# Patient Record
Sex: FEMALE | Employment: FULL TIME | URBAN - METROPOLITAN AREA
[De-identification: names, ages, dates, MRNs, and addresses within clinical notes are randomized per-mention and may not be internally consistent; named-entity substitution may affect disease eponyms.]

---

## 2021-10-29 DIAGNOSIS — M25.561 RIGHT KNEE PAIN, UNSPECIFIED CHRONICITY: Primary | ICD-10-CM

## 2021-11-11 ENCOUNTER — HOSPITAL ENCOUNTER (OUTPATIENT)
Dept: RADIOLOGY | Facility: HOSPITAL | Age: 56
Discharge: HOME | End: 2021-11-11
Attending: ORTHOPAEDIC SURGERY
Payer: COMMERCIAL

## 2021-11-11 ENCOUNTER — OFFICE VISIT (OUTPATIENT)
Dept: ORTHOPEDICS | Facility: CLINIC | Age: 56
End: 2021-11-11
Payer: COMMERCIAL

## 2021-11-11 VITALS
DIASTOLIC BLOOD PRESSURE: 89 MMHG | HEIGHT: 63 IN | HEART RATE: 75 BPM | WEIGHT: 260 LBS | SYSTOLIC BLOOD PRESSURE: 143 MMHG | BODY MASS INDEX: 46.07 KG/M2

## 2021-11-11 DIAGNOSIS — S83.241A ACUTE MEDIAL MENISCUS TEAR OF RIGHT KNEE, INITIAL ENCOUNTER: ICD-10-CM

## 2021-11-11 DIAGNOSIS — M17.11 ARTHRITIS OF KNEE, RIGHT: Primary | ICD-10-CM

## 2021-11-11 DIAGNOSIS — M25.561 RIGHT KNEE PAIN, UNSPECIFIED CHRONICITY: ICD-10-CM

## 2021-11-11 PROCEDURE — 1159F MED LIST DOCD IN RCRD: CPT | Mod: CPTII,S$GLB,, | Performed by: ORTHOPAEDIC SURGERY

## 2021-11-11 PROCEDURE — 3079F DIAST BP 80-89 MM HG: CPT | Mod: CPTII,S$GLB,, | Performed by: ORTHOPAEDIC SURGERY

## 2021-11-11 PROCEDURE — 73562 X-RAY EXAM OF KNEE 3: CPT | Mod: 26,LT,, | Performed by: RADIOLOGY

## 2021-11-11 PROCEDURE — 1160F PR REVIEW ALL MEDS BY PRESCRIBER/CLIN PHARMACIST DOCUMENTED: ICD-10-PCS | Mod: CPTII,S$GLB,, | Performed by: ORTHOPAEDIC SURGERY

## 2021-11-11 PROCEDURE — 73564 X-RAY EXAM KNEE 4 OR MORE: CPT | Mod: TC,RT

## 2021-11-11 PROCEDURE — 3008F PR BODY MASS INDEX (BMI) DOCUMENTED: ICD-10-PCS | Mod: CPTII,S$GLB,, | Performed by: ORTHOPAEDIC SURGERY

## 2021-11-11 PROCEDURE — 20610 DRAIN/INJ JOINT/BURSA W/O US: CPT | Mod: RT,S$GLB,, | Performed by: ORTHOPAEDIC SURGERY

## 2021-11-11 PROCEDURE — 3077F PR MOST RECENT SYSTOLIC BLOOD PRESSURE >= 140 MM HG: ICD-10-PCS | Mod: CPTII,S$GLB,, | Performed by: ORTHOPAEDIC SURGERY

## 2021-11-11 PROCEDURE — 99204 OFFICE O/P NEW MOD 45 MIN: CPT | Mod: 25,S$GLB,, | Performed by: ORTHOPAEDIC SURGERY

## 2021-11-11 PROCEDURE — 3079F PR MOST RECENT DIASTOLIC BLOOD PRESSURE 80-89 MM HG: ICD-10-PCS | Mod: CPTII,S$GLB,, | Performed by: ORTHOPAEDIC SURGERY

## 2021-11-11 PROCEDURE — 73562 XR KNEE ORTHO RIGHT WITH FLEXION: ICD-10-PCS | Mod: 26,LT,, | Performed by: RADIOLOGY

## 2021-11-11 PROCEDURE — 20610 LARGE JOINT ASPIRATION/INJECTION: R KNEE: ICD-10-PCS | Mod: RT,S$GLB,, | Performed by: ORTHOPAEDIC SURGERY

## 2021-11-11 PROCEDURE — 99999 PR PBB SHADOW E&M-EST. PATIENT-LVL III: ICD-10-PCS | Mod: PBBFAC,,, | Performed by: ORTHOPAEDIC SURGERY

## 2021-11-11 PROCEDURE — 3077F SYST BP >= 140 MM HG: CPT | Mod: CPTII,S$GLB,, | Performed by: ORTHOPAEDIC SURGERY

## 2021-11-11 PROCEDURE — 73564 X-RAY EXAM KNEE 4 OR MORE: CPT | Mod: 26,RT,, | Performed by: RADIOLOGY

## 2021-11-11 PROCEDURE — 99204 PR OFFICE/OUTPT VISIT, NEW, LEVL IV, 45-59 MIN: ICD-10-PCS | Mod: 25,S$GLB,, | Performed by: ORTHOPAEDIC SURGERY

## 2021-11-11 PROCEDURE — 73564 XR KNEE ORTHO RIGHT WITH FLEXION: ICD-10-PCS | Mod: 26,RT,, | Performed by: RADIOLOGY

## 2021-11-11 PROCEDURE — 1160F RVW MEDS BY RX/DR IN RCRD: CPT | Mod: CPTII,S$GLB,, | Performed by: ORTHOPAEDIC SURGERY

## 2021-11-11 PROCEDURE — 3008F BODY MASS INDEX DOCD: CPT | Mod: CPTII,S$GLB,, | Performed by: ORTHOPAEDIC SURGERY

## 2021-11-11 PROCEDURE — 99999 PR PBB SHADOW E&M-EST. PATIENT-LVL III: CPT | Mod: PBBFAC,,, | Performed by: ORTHOPAEDIC SURGERY

## 2021-11-11 PROCEDURE — 1159F PR MEDICATION LIST DOCUMENTED IN MEDICAL RECORD: ICD-10-PCS | Mod: CPTII,S$GLB,, | Performed by: ORTHOPAEDIC SURGERY

## 2021-11-11 RX ORDER — CHOLECALCIFEROL (VITAMIN D3) 25 MCG
185 TABLET ORAL
COMMUNITY

## 2021-11-11 RX ORDER — METHYLPREDNISOLONE 4 MG/1
TABLET ORAL
COMMUNITY
Start: 2021-07-14 | End: 2022-12-05

## 2021-11-11 RX ORDER — DOXYCYCLINE 100 MG/1
100 CAPSULE ORAL EVERY 12 HOURS
COMMUNITY
Start: 2021-07-14 | End: 2022-12-05

## 2021-11-11 RX ORDER — METHYLPREDNISOLONE ACETATE 80 MG/ML
80 INJECTION, SUSPENSION INTRA-ARTICULAR; INTRALESIONAL; INTRAMUSCULAR; SOFT TISSUE
Status: DISCONTINUED | OUTPATIENT
Start: 2021-11-11 | End: 2021-11-11 | Stop reason: HOSPADM

## 2021-11-11 RX ORDER — DEXTROMETHORPHAN HYDROBROMIDE, GUAIFENESIN, PHENYLEPHRINE HYDROCHLORIDE 17.5; 400; 1 MG/1; MG/1; MG/1
1 TABLET ORAL 4 TIMES DAILY
COMMUNITY
Start: 2021-07-14 | End: 2022-12-05

## 2021-11-11 RX ORDER — ASPIRIN 81 MG/1
TABLET ORAL
COMMUNITY

## 2021-11-11 RX ORDER — PROMETHAZINE HYDROCHLORIDE AND DEXTROMETHORPHAN HYDROBROMIDE 6.25; 15 MG/5ML; MG/5ML
SYRUP ORAL
COMMUNITY
Start: 2021-07-14 | End: 2022-12-05

## 2021-11-11 RX ADMIN — METHYLPREDNISOLONE ACETATE 80 MG: 80 INJECTION, SUSPENSION INTRA-ARTICULAR; INTRALESIONAL; INTRAMUSCULAR; SOFT TISSUE at 02:11

## 2022-03-10 ENCOUNTER — OFFICE VISIT (OUTPATIENT)
Dept: ORTHOPEDICS | Facility: CLINIC | Age: 57
End: 2022-03-10
Payer: COMMERCIAL

## 2022-03-10 VITALS — BODY MASS INDEX: 48.46 KG/M2 | WEIGHT: 273.5 LBS | HEIGHT: 63 IN

## 2022-03-10 DIAGNOSIS — M17.11 ARTHRITIS OF KNEE, RIGHT: Primary | ICD-10-CM

## 2022-03-10 DIAGNOSIS — S83.241A ACUTE MEDIAL MENISCUS TEAR OF RIGHT KNEE, INITIAL ENCOUNTER: ICD-10-CM

## 2022-03-10 PROBLEM — E55.9 VITAMIN D DEFICIENCY: Status: ACTIVE | Noted: 2022-03-10

## 2022-03-10 PROBLEM — E78.1 HYPERTRIGLYCERIDEMIA: Status: ACTIVE | Noted: 2022-03-10

## 2022-03-10 PROCEDURE — 99213 OFFICE O/P EST LOW 20 MIN: CPT | Mod: 25,S$GLB,, | Performed by: ORTHOPAEDIC SURGERY

## 2022-03-10 PROCEDURE — 1159F PR MEDICATION LIST DOCUMENTED IN MEDICAL RECORD: ICD-10-PCS | Mod: CPTII,S$GLB,, | Performed by: ORTHOPAEDIC SURGERY

## 2022-03-10 PROCEDURE — 1159F MED LIST DOCD IN RCRD: CPT | Mod: CPTII,S$GLB,, | Performed by: ORTHOPAEDIC SURGERY

## 2022-03-10 PROCEDURE — 99213 PR OFFICE/OUTPT VISIT, EST, LEVL III, 20-29 MIN: ICD-10-PCS | Mod: 25,S$GLB,, | Performed by: ORTHOPAEDIC SURGERY

## 2022-03-10 PROCEDURE — 3008F BODY MASS INDEX DOCD: CPT | Mod: CPTII,S$GLB,, | Performed by: ORTHOPAEDIC SURGERY

## 2022-03-10 PROCEDURE — 99999 PR PBB SHADOW E&M-EST. PATIENT-LVL III: ICD-10-PCS | Mod: PBBFAC,,, | Performed by: ORTHOPAEDIC SURGERY

## 2022-03-10 PROCEDURE — 99999 PR PBB SHADOW E&M-EST. PATIENT-LVL III: CPT | Mod: PBBFAC,,, | Performed by: ORTHOPAEDIC SURGERY

## 2022-03-10 PROCEDURE — 20610 DRAIN/INJ JOINT/BURSA W/O US: CPT | Mod: RT,S$GLB,, | Performed by: ORTHOPAEDIC SURGERY

## 2022-03-10 PROCEDURE — 20610 LARGE JOINT ASPIRATION/INJECTION: R KNEE: ICD-10-PCS | Mod: RT,S$GLB,, | Performed by: ORTHOPAEDIC SURGERY

## 2022-03-10 PROCEDURE — 3008F PR BODY MASS INDEX (BMI) DOCUMENTED: ICD-10-PCS | Mod: CPTII,S$GLB,, | Performed by: ORTHOPAEDIC SURGERY

## 2022-03-10 RX ORDER — METHYLPREDNISOLONE ACETATE 80 MG/ML
80 INJECTION, SUSPENSION INTRA-ARTICULAR; INTRALESIONAL; INTRAMUSCULAR; SOFT TISSUE
Status: DISCONTINUED | OUTPATIENT
Start: 2022-03-10 | End: 2022-03-10 | Stop reason: HOSPADM

## 2022-03-10 RX ADMIN — METHYLPREDNISOLONE ACETATE 80 MG: 80 INJECTION, SUSPENSION INTRA-ARTICULAR; INTRALESIONAL; INTRAMUSCULAR; SOFT TISSUE at 02:03

## 2022-03-10 NOTE — PROGRESS NOTES
Subjective:     Patient ID: Simi Constantino is a 56 y.o. female.    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  She used to fall a lot down stairs growing up.  At 1 point Dr.Jared Raya at [facility]  injected her knee with good relief.  Now she is having something moving and catching inside her knee with some swelling.  Any time she tries to bend it she feels something moves in then it will go and get going.  She does go to [employer] and does quite a bit of quad strengthening and hamstring strengthening exercises and she described which she does.  Now she is working for the [employer] as a nurse.  She took up to 1600 mg of ibuprofen a day to help ease it up and now she is down to 600 mg. She feels some tightness in the knee.  No history of numbness or tingling or back pain or history of gout    No fever no chills no shortness of breath or difficulty with chewing or swallowing loss of bowel bladder control blurry vision double vision or loss of sense smell or taste    03/10/2022  Right knee arthritis.  Last visit we injected her with Depo-Medrol with great relief.  She takes ibuprofen 600 sometimes 800 mg once a day.  Occasional Tylenol.  She still working.  Her pain around 3/10.  With activities it gets really bad.  She is maintaining quite active life and she does exercise.  No fever no chills no shortness of breath or difficulty with chewing or swallowing loss of bowel bladder control blurry vision double vision loss sense smell or taste.  The injection seems to have helped quite a bit that she is not feeling catching locking and we did not proceed with the MRI  No past medical history on file.  No past surgical history on file.  Family History   Problem Relation Age of Onset    No Known Problems Mother     No Known Problems Father     No Known Problems Sister     No Known Problems Brother      No Known Problems Maternal Grandmother     No Known Problems Maternal Grandfather     No Known Problems Paternal Grandmother     No Known Problems Paternal Grandfather      Social History     Socioeconomic History    Marital status:    Tobacco Use    Smoking status: Never Smoker    Smokeless tobacco: Never Used     Medication List with Changes/Refills   Current Medications    ASPIRIN (ECOTRIN) 81 MG EC TABLET        DECONEX DMX 10-17.5-400 MG TAB    Take 1 tablet by mouth 4 (four) times daily.    DOXYCYCLINE (VIBRAMYCIN) 100 MG CAP    Take 100 mg by mouth every 12 (twelve) hours.    METHYLPREDNISOLONE (MEDROL DOSEPACK) 4 MG TABLET    Take by mouth.    PROMETHAZINE-DEXTROMETHORPHAN (PROMETHAZINE-DM) 6.25-15 MG/5 ML SYRP    SMARTSI Milliliter(s) By Mouth PRN    VITAMIN D (VITAMIN D3) 1000 UNITS TAB    Take 185 mg by mouth.     Review of patient's allergies indicates:  No Known Allergies  Review of Systems   Constitutional: Negative for decreased appetite.   HENT: Negative for tinnitus.    Eyes: Negative for double vision.   Cardiovascular: Negative for chest pain.   Respiratory: Negative for wheezing.    Hematologic/Lymphatic: Negative for bleeding problem.   Skin: Negative for dry skin.   Musculoskeletal: Positive for joint pain and stiffness. Negative for arthritis, back pain, gout, joint swelling and neck pain.   Gastrointestinal: Negative for abdominal pain.   Genitourinary: Negative for bladder incontinence.   Neurological: Negative for numbness, paresthesias and sensory change.   Psychiatric/Behavioral: Negative for altered mental status.       Objective:   Body mass index is 48.44 kg/m².  There were no vitals filed for this visit.       General    Constitutional: She is oriented to person, place, and time. She appears well-developed.   HENT:   Head: Atraumatic.   Eyes: EOM are normal.   Cardiovascular: Normal rate.    Pulmonary/Chest: Effort normal.   Neurological: She is alert and oriented to  person, place, and time.   Psychiatric: Judgment normal.           Ambulating with very slight limp.    Pelvis is level  Bilateral hips full motion no pain in the groin  Hip flexors, abductors, adductors, quads, hamstrings, ankle extensors and flexors are 5/5  Left knee with full motion no pain.  Collaterals and cruciates are stable.  No crepitus no warmness to touch no swelling  Right knee mild swelling.  Medial joint tenderness.  Positive Meghann sign medially.  There is some crepitus to compression on the patella.  There is a plica  you can feel on the lateral femoral condyle.  Collaterals and cruciates are stable.  Range of motion 0-120 degrees  Calves are soft nontender  Ankle motion is intact  Negative straight leg raising    Relevant imaging results reviewed and interpreted by me, discussed with the patient and / or family today     X-ray 11/11/2021 bilateral knees with the right knee moderate medial joint loss with marginal osteophytes most pronounced medially in and slightly anteriorly.  There is no evidence of fracture with mild effusion.  Left knee with very mild if any medial joint narrowing.  No fracture no osteophytes seen      Assessment:     Encounter Diagnoses   Name Primary?    Arthritis of knee, right Yes    Acute medial meniscus tear of right knee, initial encounter         Plan:   Arthritis of knee, right  -     Large Joint Aspiration/Injection: R knee    Acute medial meniscus tear of right knee, initial encounter         Patient Instructions   Ibuprofen 600 mg maximum does a daily is 4 times a day to make it 2400 mg  Tylenol 650 mg twice a day  Since the last steroid injection helped minimize the intake of NSAIDs to 600 mg a day we will repeat the injection today  Next time you would need viscosupplementation/rooster comb gel/Synvisc-One.  Synvisc-One if it helps you can have it repeated once every 6 months.  Synvisc-One might take 6-8 weeks for it to work  I will see you back in 3  months      The pros and cons of steroid injection discussed.  Also the pros and cons of NSAIDs/ibuprofen.  If she still having the catching feeling that she described we will obtain MRI on her right knee.  She is maintaining very active gym exercise program which is at this point sufficient and does not need to go to therapy.    Disclaimer: This note was prepared using a voice recognition system and is likely to have sound alike errors within the text.

## 2022-03-10 NOTE — PROCEDURES
Large Joint Aspiration/Injection: R knee    Date/Time: 3/10/2022 2:20 PM  Performed by: Arden Sullivan MD  Authorized by: Arden Sullivan MD     Consent Done?:  Yes (Verbal)  Site marked: the procedure site was marked    Timeout: prior to procedure the correct patient, procedure, and site was verified      Local anesthesia used?: Yes    Local anesthetic:  Lidocaine 1% without epinephrine    Details:  Needle Size:  22 G  Ultrasonic Guidance for needle placement?: No    Approach:  Anterolateral  Location:  Knee  Site:  R knee  Medications:  80 mg methylPREDNISolone acetate 80 mg/mL  Patient tolerance:  Patient tolerated the procedure well with no immediate complications

## 2022-03-10 NOTE — PATIENT INSTRUCTIONS
Ibuprofen 600 mg maximum does a daily is 4 times a day to make it 2400 mg  Tylenol 650 mg twice a day  Since the last steroid injection helped minimize the intake of NSAIDs to 600 mg a day we will repeat the injection today  Next time you would need viscosupplementation/rooster comb gel/Synvisc-One.  Synvisc-One if it helps you can have it repeated once every 6 months.  Synvisc-One might take 6-8 weeks for it to work  I will see you back in 3 months

## 2022-12-05 ENCOUNTER — OFFICE VISIT (OUTPATIENT)
Dept: ORTHOPEDICS | Facility: CLINIC | Age: 57
End: 2022-12-05
Payer: COMMERCIAL

## 2022-12-05 VITALS — WEIGHT: 273 LBS | HEIGHT: 63 IN | BODY MASS INDEX: 48.37 KG/M2

## 2022-12-05 DIAGNOSIS — M17.11 ARTHRITIS OF KNEE, RIGHT: Primary | ICD-10-CM

## 2022-12-05 PROCEDURE — 20610 DRAIN/INJ JOINT/BURSA W/O US: CPT | Mod: RT,S$GLB,, | Performed by: ORTHOPAEDIC SURGERY

## 2022-12-05 PROCEDURE — 1159F PR MEDICATION LIST DOCUMENTED IN MEDICAL RECORD: ICD-10-PCS | Mod: CPTII,S$GLB,, | Performed by: ORTHOPAEDIC SURGERY

## 2022-12-05 PROCEDURE — 99213 OFFICE O/P EST LOW 20 MIN: CPT | Mod: 25,S$GLB,, | Performed by: ORTHOPAEDIC SURGERY

## 2022-12-05 PROCEDURE — 3008F BODY MASS INDEX DOCD: CPT | Mod: CPTII,S$GLB,, | Performed by: ORTHOPAEDIC SURGERY

## 2022-12-05 PROCEDURE — 99999 PR PBB SHADOW E&M-EST. PATIENT-LVL III: CPT | Mod: PBBFAC,,, | Performed by: ORTHOPAEDIC SURGERY

## 2022-12-05 PROCEDURE — 3008F PR BODY MASS INDEX (BMI) DOCUMENTED: ICD-10-PCS | Mod: CPTII,S$GLB,, | Performed by: ORTHOPAEDIC SURGERY

## 2022-12-05 PROCEDURE — 99213 PR OFFICE/OUTPT VISIT, EST, LEVL III, 20-29 MIN: ICD-10-PCS | Mod: 25,S$GLB,, | Performed by: ORTHOPAEDIC SURGERY

## 2022-12-05 PROCEDURE — 1159F MED LIST DOCD IN RCRD: CPT | Mod: CPTII,S$GLB,, | Performed by: ORTHOPAEDIC SURGERY

## 2022-12-05 PROCEDURE — 20610 LARGE JOINT ASPIRATION/INJECTION: R KNEE: ICD-10-PCS | Mod: RT,S$GLB,, | Performed by: ORTHOPAEDIC SURGERY

## 2022-12-05 PROCEDURE — 99999 PR PBB SHADOW E&M-EST. PATIENT-LVL III: ICD-10-PCS | Mod: PBBFAC,,, | Performed by: ORTHOPAEDIC SURGERY

## 2022-12-05 RX ORDER — IBUPROFEN 800 MG/1
TABLET ORAL
COMMUNITY

## 2022-12-05 RX ORDER — METHYLPREDNISOLONE ACETATE 80 MG/ML
80 INJECTION, SUSPENSION INTRA-ARTICULAR; INTRALESIONAL; INTRAMUSCULAR; SOFT TISSUE
Status: DISCONTINUED | OUTPATIENT
Start: 2022-12-05 | End: 2022-12-05 | Stop reason: HOSPADM

## 2022-12-05 RX ADMIN — METHYLPREDNISOLONE ACETATE 80 MG: 80 INJECTION, SUSPENSION INTRA-ARTICULAR; INTRALESIONAL; INTRAMUSCULAR; SOFT TISSUE at 07:12

## 2022-12-05 NOTE — PATIENT INSTRUCTIONS
Right knee arthritis and the Depo-Medrol injection seems to work really well for long time   Will hold off on viscosupplementation at this time   Take Tylenol 650 mg not to exceed twice a day if needed before you trach ibuprofen 800   Ice the needed next few days if it hurts more  Will see you back in 6 months

## 2022-12-05 NOTE — PROGRESS NOTES
Subjective:     Patient ID: Simi Constantino is a 57 y.o. female.    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  She used to fall a lot down stairs growing up.  At 1 point Dr[facility]Jared Raya at [facility]  injected her knee with good relief.  Now she is having something moving and catching inside her knee with some swelling.  Any time she tries to bend it she feels something moves in then it will go and get going.  She does go to [employer] and does quite a bit of quad strengthening and hamstring strengthening exercises and she described which she does.  Now she is working for the [employer] as a nurse.  She took up to 1600 mg of ibuprofen a day to help ease it up and now she is down to 600 mg. She feels some tightness in the knee.  No history of numbness or tingling or back pain or history of gout    No fever no chills no shortness of breath or difficulty with chewing or swallowing loss of bowel bladder control blurry vision double vision or loss of sense smell or taste    03/10/2022  Right knee arthritis.  Last visit we injected her with Depo-Medrol with great relief.  She takes ibuprofen 600 sometimes 800 mg once a day.  Occasional Tylenol.  She still working.  Her pain around 3/10.  With activities it gets really bad.  She is maintaining quite active life and she does exercise.  No fever no chills no shortness of breath or difficulty with chewing or swallowing loss of bowel bladder control blurry vision double vision loss sense smell or taste.  The injection seems to have helped quite a bit that she is not feeling catching locking and we did not proceed with the MRI        12/05/2022   Right knee arthritis.  She stated the Depo-Medrol last time given hurt a lot that day but the next day the pain completely subsided.  She only takes ibuprofen 800 once a day once it wears off.  This time only wore off like  a month ago.  We did discuss taking Tylenol instead of ibuprofen to decrease the risk of gastrointestinal issues as well as kidney issues.  Her pain is 5/10 when she over does it.  She still working at the state.    No fever no chills no shortness of breath or difficulty with chewing swallowing loss of bowel bladder control.  There is no feeling of any catching locking at this time  History reviewed. No pertinent past medical history.  History reviewed. No pertinent surgical history.  Family History   Problem Relation Age of Onset    No Known Problems Mother     No Known Problems Father     No Known Problems Sister     No Known Problems Brother     No Known Problems Maternal Grandmother     No Known Problems Maternal Grandfather     No Known Problems Paternal Grandmother     No Known Problems Paternal Grandfather      Social History     Socioeconomic History    Marital status:    Tobacco Use    Smoking status: Never    Smokeless tobacco: Never     Medication List with Changes/Refills   Current Medications    ASPIRIN (ECOTRIN) 81 MG EC TABLET        IBUPROFEN (ADVIL,MOTRIN) 800 MG TABLET    800 MG  .    VITAMIN D (VITAMIN D3) 1000 UNITS TAB    Take 185 mg by mouth.   Discontinued Medications    DECONEX DMX 10-17.5-400 MG TAB    Take 1 tablet by mouth 4 (four) times daily.    DOXYCYCLINE (VIBRAMYCIN) 100 MG CAP    Take 100 mg by mouth every 12 (twelve) hours.    METHYLPREDNISOLONE (MEDROL DOSEPACK) 4 MG TABLET    Take by mouth.    PROMETHAZINE-DEXTROMETHORPHAN (PROMETHAZINE-DM) 6.25-15 MG/5 ML SYRP    SMARTSI Milliliter(s) By Mouth PRN     Review of patient's allergies indicates:  No Known Allergies  Review of Systems   Constitutional: Negative for decreased appetite.   HENT:  Negative for tinnitus.    Eyes:  Negative for double vision.   Cardiovascular:  Negative for chest pain.   Respiratory:  Negative for wheezing.    Hematologic/Lymphatic: Negative for bleeding problem.   Skin:  Negative for dry skin.    Musculoskeletal:  Positive for joint pain and stiffness. Negative for arthritis, back pain, gout, joint swelling and neck pain.   Gastrointestinal:  Negative for abdominal pain.   Genitourinary:  Negative for bladder incontinence.   Neurological:  Negative for numbness, paresthesias and sensory change.   Psychiatric/Behavioral:  Negative for altered mental status.      Objective:   Body mass index is 48.36 kg/m².  There were no vitals filed for this visit.       General    Constitutional: She is oriented to person, place, and time. She appears well-developed.   HENT:   Head: Atraumatic.   Eyes: EOM are normal.   Cardiovascular:  Normal rate.            Pulmonary/Chest: Effort normal.   Neurological: She is alert and oriented to person, place, and time.   Psychiatric: Judgment normal.         Ambulating with very slight limp.    Pelvis is level  Bilateral hips full motion no pain in the groin  Hip flexors, abductors, adductors, quads, hamstrings, ankle extensors and flexors are 5/5  Left knee with full motion no pain.  Collaterals and cruciates are stable.  No crepitus no warmness to touch no swelling  Right knee mild swelling.  Medial joint tenderness.  Positive Meghann sign medially.  There is some crepitus to compression on the patella.  There is a plica  you can feel on the lateral femoral condyle.  Collaterals and cruciates are stable.  Range of motion 0-120 degrees  Calves are soft nontender  Ankle motion is intact  Negative straight leg raising    Relevant imaging results reviewed and interpreted by me, discussed with the patient and / or family today     X-ray 11/11/2021 bilateral knees with the right knee moderate medial joint loss with marginal osteophytes most pronounced medially in and slightly anteriorly.  There is no evidence of fracture with mild effusion.  Left knee with very mild if any medial joint narrowing.  No fracture no osteophytes seen      Assessment:     Encounter Diagnosis   Name  Primary?    Arthritis of knee, right Yes        Plan:   Arthritis of knee, right  -     Large Joint Aspiration/Injection: R knee       Patient Instructions   Right knee arthritis and the Depo-Medrol injection seems to work really well for long time   Will hold off on viscosupplementation at this time   Take Tylenol 650 mg not to exceed twice a day if needed before you trach ibuprofen 800   Ice the needed next few days if it hurts more  Will see you back in 6 months    The pros and cons of steroid injection discussed.  Also the pros and cons of NSAIDs/ibuprofen.  If she still having the catching feeling that she described we will obtain MRI on her right knee.  She is maintaining very active gym exercise program which is at this point sufficient and does not need to go to therapy.    Disclaimer: This note was prepared using a voice recognition system and is likely to have sound alike errors within the text.

## 2022-12-05 NOTE — PROCEDURES
Large Joint Aspiration/Injection: R knee    Date/Time: 12/5/2022 7:40 AM  Performed by: Arden Sullivan MD  Authorized by: Arden Sullivan MD     Consent Done?:  Yes (Verbal)  Indications:  Arthritis  Site marked: the procedure site was marked    Timeout: prior to procedure the correct patient, procedure, and site was verified      Local anesthesia used?: Yes    Local anesthetic:  Lidocaine 1% without epinephrine    Details:  Needle Size:  22 G  Ultrasonic Guidance for needle placement?: No    Approach:  Anterolateral  Location:  Knee  Site:  R knee  Medications:  80 mg methylPREDNISolone acetate 80 mg/mL  Patient tolerance:  Patient tolerated the procedure well with no immediate complications

## 2023-02-22 DIAGNOSIS — M25.561 RIGHT KNEE PAIN, UNSPECIFIED CHRONICITY: Primary | ICD-10-CM

## 2023-03-08 ENCOUNTER — PATIENT MESSAGE (OUTPATIENT)
Dept: ORTHOPEDICS | Facility: CLINIC | Age: 58
End: 2023-03-08
Payer: COMMERCIAL

## 2023-03-16 ENCOUNTER — OFFICE VISIT (OUTPATIENT)
Dept: ORTHOPEDICS | Facility: CLINIC | Age: 58
End: 2023-03-16
Payer: COMMERCIAL

## 2023-03-16 ENCOUNTER — HOSPITAL ENCOUNTER (OUTPATIENT)
Dept: RADIOLOGY | Facility: HOSPITAL | Age: 58
Discharge: HOME | End: 2023-03-16
Attending: ORTHOPAEDIC SURGERY
Payer: COMMERCIAL

## 2023-03-16 VITALS — HEIGHT: 63 IN | BODY MASS INDEX: 48.39 KG/M2 | WEIGHT: 273.13 LBS

## 2023-03-16 DIAGNOSIS — M25.561 RIGHT KNEE PAIN, UNSPECIFIED CHRONICITY: ICD-10-CM

## 2023-03-16 DIAGNOSIS — M17.11 ARTHRITIS OF KNEE, RIGHT: Primary | ICD-10-CM

## 2023-03-16 PROCEDURE — 73564 XR KNEE ORTHO RIGHT WITH FLEXION: ICD-10-PCS | Mod: 26,RT,, | Performed by: RADIOLOGY

## 2023-03-16 PROCEDURE — 99999 PR PBB SHADOW E&M-EST. PATIENT-LVL III: ICD-10-PCS | Mod: PBBFAC,,, | Performed by: ORTHOPAEDIC SURGERY

## 2023-03-16 PROCEDURE — 73564 X-RAY EXAM KNEE 4 OR MORE: CPT | Mod: TC,RT

## 2023-03-16 PROCEDURE — 20610 DRAIN/INJ JOINT/BURSA W/O US: CPT | Mod: RT,S$GLB,, | Performed by: ORTHOPAEDIC SURGERY

## 2023-03-16 PROCEDURE — 3008F PR BODY MASS INDEX (BMI) DOCUMENTED: ICD-10-PCS | Mod: CPTII,S$GLB,, | Performed by: ORTHOPAEDIC SURGERY

## 2023-03-16 PROCEDURE — 73562 XR KNEE ORTHO RIGHT WITH FLEXION: ICD-10-PCS | Mod: 26,LT,, | Performed by: RADIOLOGY

## 2023-03-16 PROCEDURE — 73562 X-RAY EXAM OF KNEE 3: CPT | Mod: 26,LT,, | Performed by: RADIOLOGY

## 2023-03-16 PROCEDURE — 1160F RVW MEDS BY RX/DR IN RCRD: CPT | Mod: CPTII,S$GLB,, | Performed by: ORTHOPAEDIC SURGERY

## 2023-03-16 PROCEDURE — 1159F MED LIST DOCD IN RCRD: CPT | Mod: CPTII,S$GLB,, | Performed by: ORTHOPAEDIC SURGERY

## 2023-03-16 PROCEDURE — 73564 X-RAY EXAM KNEE 4 OR MORE: CPT | Mod: 26,RT,, | Performed by: RADIOLOGY

## 2023-03-16 PROCEDURE — 1159F PR MEDICATION LIST DOCUMENTED IN MEDICAL RECORD: ICD-10-PCS | Mod: CPTII,S$GLB,, | Performed by: ORTHOPAEDIC SURGERY

## 2023-03-16 PROCEDURE — 99213 OFFICE O/P EST LOW 20 MIN: CPT | Mod: 25,S$GLB,, | Performed by: ORTHOPAEDIC SURGERY

## 2023-03-16 PROCEDURE — 1160F PR REVIEW ALL MEDS BY PRESCRIBER/CLIN PHARMACIST DOCUMENTED: ICD-10-PCS | Mod: CPTII,S$GLB,, | Performed by: ORTHOPAEDIC SURGERY

## 2023-03-16 PROCEDURE — 99999 PR PBB SHADOW E&M-EST. PATIENT-LVL III: CPT | Mod: PBBFAC,,, | Performed by: ORTHOPAEDIC SURGERY

## 2023-03-16 PROCEDURE — 3008F BODY MASS INDEX DOCD: CPT | Mod: CPTII,S$GLB,, | Performed by: ORTHOPAEDIC SURGERY

## 2023-03-16 PROCEDURE — 20610 LARGE JOINT ASPIRATION/INJECTION: R KNEE: ICD-10-PCS | Mod: RT,S$GLB,, | Performed by: ORTHOPAEDIC SURGERY

## 2023-03-16 PROCEDURE — 99213 PR OFFICE/OUTPT VISIT, EST, LEVL III, 20-29 MIN: ICD-10-PCS | Mod: 25,S$GLB,, | Performed by: ORTHOPAEDIC SURGERY

## 2023-03-16 RX ORDER — METHYLPREDNISOLONE ACETATE 80 MG/ML
80 INJECTION, SUSPENSION INTRA-ARTICULAR; INTRALESIONAL; INTRAMUSCULAR; SOFT TISSUE
Status: DISCONTINUED | OUTPATIENT
Start: 2023-03-16 | End: 2023-03-16 | Stop reason: HOSPADM

## 2023-03-16 RX ADMIN — METHYLPREDNISOLONE ACETATE 80 MG: 80 INJECTION, SUSPENSION INTRA-ARTICULAR; INTRALESIONAL; INTRAMUSCULAR; SOFT TISSUE at 03:03

## 2023-03-16 NOTE — PROCEDURES
Large Joint Aspiration/Injection: R knee    Date/Time: 3/16/2023 3:00 PM  Performed by: Arden Sullivan MD  Authorized by: Arden Sullivan MD     Consent Done?:  Yes (Verbal)  Indications:  Arthritis  Site marked: the procedure site was marked    Timeout: prior to procedure the correct patient, procedure, and site was verified      Local anesthesia used?: Yes    Local anesthetic:  Lidocaine 1% without epinephrine    Details:  Needle Size:  22 G  Ultrasonic Guidance for needle placement?: No    Approach:  Anterolateral  Location:  Knee  Site:  R knee  Medications:  80 mg methylPREDNISolone acetate 80 mg/mL  Patient tolerance:  Patient tolerated the procedure well with no immediate complications

## 2023-03-16 NOTE — PATIENT INSTRUCTIONS
X-rays today show no difference from 2021 with right knee being moderately severe arthritis  The injection seems to help and you been getting them twice a day year of steroid   You taking ibuprofen 600 very gingerly maybe on severe days  Tylenol also you can take 650 twice a day as needed  I will see you back in June for re-evaluation

## 2023-03-16 NOTE — PROGRESS NOTES
Subjective:     Patient ID: Simi Constantino is a 57 y.o. female.    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  She used to fall a lot down stairs growing up.  At 1 point Dr.Jared Raya at [facility]  injected her knee with good relief.  Now she is having something moving and catching inside her knee with some swelling.  Any time she tries to bend it she feels something moves in then it will go and get going.  She does go to [employer] and does quite a bit of quad strengthening and hamstring strengthening exercises and she described which she does.  Now she is working for the [employer] as a nurse.  She took up to 1600 mg of ibuprofen a day to help ease it up and now she is down to 600 mg. She feels some tightness in the knee.  No history of numbness or tingling or back pain or history of gout    No fever no chills no shortness of breath or difficulty with chewing or swallowing loss of bowel bladder control blurry vision double vision or loss of sense smell or taste    03/10/2022  Right knee arthritis.  Last visit we injected her with Depo-Medrol with great relief.  She takes ibuprofen 600 sometimes 800 mg once a day.  Occasional Tylenol.  She still working.  Her pain around 3/10.  With activities it gets really bad.  She is maintaining quite active life and she does exercise.  No fever no chills no shortness of breath or difficulty with chewing or swallowing loss of bowel bladder control blurry vision double vision loss sense smell or taste.  The injection seems to have helped quite a bit that she is not feeling catching locking and we did not proceed with the MRI        12/05/2022   Right knee arthritis.  She stated the Depo-Medrol last time given hurt a lot that day but the next day the pain completely subsided.  She only takes ibuprofen 800 once a day once it wears off.   This time only wore off like a month ago.  We did discuss taking Tylenol instead of ibuprofen to decrease the risk of gastrointestinal issues as well as kidney issues.  Her pain is 5/10 when she over does it.  She still working at the state.    No fever no chills no shortness of breath or difficulty with chewing swallowing loss of bowel bladder control.  There is no feeling of any catching locking at this time    03/16/2023  Right knee arthritis.  The injections seems to work.  We obtained new x-ray today to compared previously from 2021.  Looking at her records she probably received an injection of steroid twice a year.  She does take occasional ibuprofen but she is worried about developing problems from it.  If she takes maybe once a week and ibuprofen 600.  She is traveling sometime in October and I would like her to come in like 3 4 weeks prior to see if she would like steroid injection.  She is not at a point that she knees viscosupplementation.  She keeps active as a nurse.  Today her pain is 0-2/10 however was much higher a week ago it eased up a little bit.  No fever no chills no shortness of breath or difficulty with chewing swallowing loss of bowel bladder control blurry vision double vision loss sense smell or taste    History reviewed. No pertinent past medical history.  History reviewed. No pertinent surgical history.  Family History   Problem Relation Age of Onset    No Known Problems Mother     No Known Problems Father     No Known Problems Sister     No Known Problems Brother     No Known Problems Maternal Grandmother     No Known Problems Maternal Grandfather     No Known Problems Paternal Grandmother     No Known Problems Paternal Grandfather      Social History     Socioeconomic History    Marital status:    Tobacco Use    Smoking status: Never    Smokeless tobacco: Never     Medication List with Changes/Refills   Current Medications    ASPIRIN (ECOTRIN) 81 MG EC TABLET         GLUCOSAMINE/CHONDR MCKEON A SOD (OSTEO BI-FLEX ORAL)    Take by mouth.    IBUPROFEN (ADVIL,MOTRIN) 800 MG TABLET    800 MG  .    VITAMIN D (VITAMIN D3) 1000 UNITS TAB    Take 185 mg by mouth.     Review of patient's allergies indicates:  No Known Allergies  Review of Systems   Constitutional: Negative for decreased appetite.   HENT:  Negative for tinnitus.    Eyes:  Negative for double vision.   Cardiovascular:  Negative for chest pain.   Respiratory:  Negative for wheezing.    Hematologic/Lymphatic: Negative for bleeding problem.   Skin:  Negative for dry skin.   Musculoskeletal:  Positive for joint pain and stiffness. Negative for arthritis, back pain, gout, joint swelling and neck pain.   Gastrointestinal:  Negative for abdominal pain.   Genitourinary:  Negative for bladder incontinence.   Neurological:  Negative for numbness, paresthesias and sensory change.   Psychiatric/Behavioral:  Negative for altered mental status.      Objective:   Body mass index is 48.39 kg/m².  There were no vitals filed for this visit.       General    Constitutional: She is oriented to person, place, and time. She appears well-developed.   HENT:   Head: Atraumatic.   Eyes: EOM are normal.   Cardiovascular:  Normal rate.            Pulmonary/Chest: Effort normal.   Neurological: She is alert and oriented to person, place, and time.   Psychiatric: Judgment normal.         Ambulating with very slight limp.    Pelvis is level  Bilateral hips full motion no pain in the groin  Hip flexors, abductors, adductors, quads, hamstrings, ankle extensors and flexors are 5/5  Left knee with full motion no pain.  Collaterals and cruciates are stable.  No crepitus no warmness to touch no swelling  Right knee mild swelling.  Medial joint tenderness.  Positive Meghann sign medially.  There is some crepitus to compression on the patella.  There is a plica  you can feel on the lateral femoral condyle.  Collaterals and cruciates are stable.  Range of motion  0-120 degrees  Calves are soft nontender  Ankle motion is intact  Negative straight leg raising    Relevant imaging results reviewed and interpreted by me, discussed with the patient and / or family today   X-ray 03/16/2023 bilateral knees with the right knee still moderate medial joint narrowing similar to x-ray from 11/11/2021.  There is small marginal osteophytic changes.  The left knee seems to be very mild if any medial joint narrowing.  No osteophytes seen no fractures.  Quality of the bone is excellent  X-ray 11/11/2021 bilateral knees with the right knee moderate medial joint loss with marginal osteophytes most pronounced medially in and slightly anteriorly.  There is no evidence of fracture with mild effusion.  Left knee with very mild if any medial joint narrowing.  No fracture no osteophytes seen      Assessment:     Encounter Diagnosis   Name Primary?    Arthritis of knee, right Yes        Plan:   Arthritis of knee, right    Other orders  -     Large Joint Aspiration/Injection: R knee       Patient Instructions   X-rays today show no difference from 2021 with right knee being moderately severe arthritis  The injection seems to help and you been getting them twice a day year of steroid   You taking ibuprofen 600 very gingerly maybe on severe days  Tylenol also you can take 650 twice a day as needed  I will see you back in June for re-evaluation    The pros and cons of steroid injection discussed.  Also the pros and cons of NSAIDs/ibuprofen.  If she still having the catching feeling that she described we will obtain MRI on her right knee.  She is maintaining very active gym exercise program which is at this point sufficient and does not need to go to therapy.    Disclaimer: This note was prepared using a voice recognition system and is likely to have sound alike errors within the text.

## 2023-04-11 ENCOUNTER — PATIENT MESSAGE (OUTPATIENT)
Dept: ORTHOPEDICS | Facility: CLINIC | Age: 58
End: 2023-04-11
Payer: COMMERCIAL

## 2023-05-02 ENCOUNTER — PATIENT MESSAGE (OUTPATIENT)
Dept: ORTHOPEDICS | Facility: CLINIC | Age: 58
End: 2023-05-02
Payer: COMMERCIAL

## 2023-07-06 ENCOUNTER — OFFICE VISIT (OUTPATIENT)
Dept: ORTHOPEDICS | Facility: CLINIC | Age: 58
End: 2023-07-06
Payer: COMMERCIAL

## 2023-07-06 VITALS — HEIGHT: 63 IN | WEIGHT: 273 LBS | BODY MASS INDEX: 48.37 KG/M2

## 2023-07-06 DIAGNOSIS — M17.11 ARTHRITIS OF KNEE, RIGHT: Primary | ICD-10-CM

## 2023-07-06 DIAGNOSIS — S83.241A ACUTE MEDIAL MENISCUS TEAR OF RIGHT KNEE, INITIAL ENCOUNTER: ICD-10-CM

## 2023-07-06 PROCEDURE — 99999 PR PBB SHADOW E&M-EST. PATIENT-LVL III: CPT | Mod: PBBFAC,,, | Performed by: ORTHOPAEDIC SURGERY

## 2023-07-06 PROCEDURE — 1159F PR MEDICATION LIST DOCUMENTED IN MEDICAL RECORD: ICD-10-PCS | Mod: CPTII,S$GLB,, | Performed by: ORTHOPAEDIC SURGERY

## 2023-07-06 PROCEDURE — 99213 PR OFFICE/OUTPT VISIT, EST, LEVL III, 20-29 MIN: ICD-10-PCS | Mod: S$GLB,,, | Performed by: ORTHOPAEDIC SURGERY

## 2023-07-06 PROCEDURE — 3008F PR BODY MASS INDEX (BMI) DOCUMENTED: ICD-10-PCS | Mod: CPTII,S$GLB,, | Performed by: ORTHOPAEDIC SURGERY

## 2023-07-06 PROCEDURE — 99213 OFFICE O/P EST LOW 20 MIN: CPT | Mod: S$GLB,,, | Performed by: ORTHOPAEDIC SURGERY

## 2023-07-06 PROCEDURE — 3008F BODY MASS INDEX DOCD: CPT | Mod: CPTII,S$GLB,, | Performed by: ORTHOPAEDIC SURGERY

## 2023-07-06 PROCEDURE — 99999 PR PBB SHADOW E&M-EST. PATIENT-LVL III: ICD-10-PCS | Mod: PBBFAC,,, | Performed by: ORTHOPAEDIC SURGERY

## 2023-07-06 PROCEDURE — 1159F MED LIST DOCD IN RCRD: CPT | Mod: CPTII,S$GLB,, | Performed by: ORTHOPAEDIC SURGERY

## 2023-07-06 RX ORDER — MELOXICAM 15 MG/1
15 TABLET ORAL DAILY
Qty: 30 TABLET | Refills: 6 | Status: SHIPPED | OUTPATIENT
Start: 2023-07-06

## 2023-07-06 NOTE — PATIENT INSTRUCTIONS
You are traveling to [place] and you would like to try meloxicam instead of ibuprofen  Will get you started 15 mg once a day as needed  If it does not work and you do not see no effect from it you can still go back taking ibuprofen 800 mg do not take it more than twice a day with food  You do not need any injections right now you doing okay we did give you the shot on 03/16/2023  If you anticipate that you going to do a lot of walking or work in the yd please take the meloxicam    I will see you back on October 12/4:00 p.m..  You are traveling to [place] at that time and will see you before you travel to see what else is going

## 2023-07-06 NOTE — PROGRESS NOTES
Subjective:     Patient ID: Simi Constantino is a 57 y.o. female.    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  She used to fall a lot down stairs growing up.  At 1 point Dr[facility]ared Raya at [facility]  injected her knee with good relief.  Now she is having something moving and catching inside her knee with some swelling.  Any time she tries to bend it she feels something moves in then it will go and get going.  She does go to [employer] gym and does quite a bit of quad strengthening and hamstring strengthening exercises and she described which she does.  Now she is working for the [employer] as a nurse.  She took up to 1600 mg of ibuprofen a day to help ease it up and now she is down to 600 mg. She feels some tightness in the knee.  No history of numbness or tingling or back pain or history of gout    No fever no chills no shortness of breath or difficulty with chewing or swallowing loss of bowel bladder control blurry vision double vision or loss of sense smell or taste    03/10/2022  Right knee arthritis.  Last visit we injected her with Depo-Medrol with great relief.  She takes ibuprofen 600 sometimes 800 mg once a day.  Occasional Tylenol.  She still working.  Her pain around 3/10.  With activities it gets really bad.  She is maintaining quite active life and she does exercise.  No fever no chills no shortness of breath or difficulty with chewing or swallowing loss of bowel bladder control blurry vision double vision loss sense smell or taste.  The injection seems to have helped quite a bit that she is not feeling catching locking and we did not proceed with the MRI        12/05/2022   Right knee arthritis.  She stated the Depo-Medrol last time given hurt a lot that day but the next day the pain completely subsided.  She only takes ibuprofen 800 once a day once it wears off.  This time only wore off  like a month ago.  We did discuss taking Tylenol instead of ibuprofen to decrease the risk of gastrointestinal issues as well as kidney issues.  Her pain is 5/10 when she over does it.  She still working at the state.    No fever no chills no shortness of breath or difficulty with chewing swallowing loss of bowel bladder control.  There is no feeling of any catching locking at this time    03/16/2023  Right knee arthritis.  The injections seems to work.  We obtained new x-ray today to compared previously from 2021.  Looking at her records she probably received an injection of steroid twice a year.  She does take occasional ibuprofen but she is worried about developing problems from it.  If she takes maybe once a week and ibuprofen 600.  She is traveling sometime in October and I would like her to come in like 3 4 weeks prior to see if she would like steroid injection.  She is not at a point that she knees viscosupplementation.  She keeps active as a nurse.  Today her pain is 0-2/10 however was much higher a week ago it eased up a little bit.  No fever no chills no shortness of breath or difficulty with chewing swallowing loss of bowel bladder control blurry vision double vision loss sense smell or taste        07/06/2023   Right knee arthritis/medial meniscus tear.  Last visit on 03/16/2023 she received Depo-Medrol injection.  She occasionally takes ibuprofen 800 over-the-counter and or Tylenol.  She does not take it constantly.  The other day she stepped in a hole in the yd and started with pain.  She works all day long in the [employer] in the evening she gets pain.  We discussed meloxicam as alternative to ibuprofen and that she can not take it with the ibuprofen and she wants to try it.  She is planning on taking a trip to [place] and will require a lot of walking to words the later part of October and she wants to come in just in case she needs an injection prior to the trip.  Occasionally the knee becomes swollen and  tight and I did tell her that is the nature of the arthritis it comes and goes and sometimes might take a day or 2 before the swelling goes away.  Does days she needs to take the anti-inflammatory and/or Tylenol.  These stays are infrequent.  Pain today 0/10 doing really well  No past medical history on file.  No past surgical history on file.  Family History   Problem Relation Age of Onset    No Known Problems Mother     No Known Problems Father     No Known Problems Sister     No Known Problems Brother     No Known Problems Maternal Grandmother     No Known Problems Maternal Grandfather     No Known Problems Paternal Grandmother     No Known Problems Paternal Grandfather      Social History     Socioeconomic History    Marital status:    Tobacco Use    Smoking status: Never    Smokeless tobacco: Never     Medication List with Changes/Refills   New Medications    MELOXICAM (MOBIC) 15 MG TABLET    Take 1 tablet (15 mg total) by mouth once daily. Take with food   Current Medications    ASPIRIN (ECOTRIN) 81 MG EC TABLET        GLUCOSAMINE/CHONDR MCKEON A SOD (OSTEO BI-FLEX ORAL)    Take by mouth.    IBUPROFEN (ADVIL,MOTRIN) 800 MG TABLET    800 MG  .    VITAMIN D (VITAMIN D3) 1000 UNITS TAB    Take 185 mg by mouth.     Review of patient's allergies indicates:  No Known Allergies  Review of Systems   Constitutional: Negative for decreased appetite.   HENT:  Negative for tinnitus.    Eyes:  Negative for double vision.   Cardiovascular:  Negative for chest pain.   Respiratory:  Negative for wheezing.    Hematologic/Lymphatic: Negative for bleeding problem.   Skin:  Negative for dry skin.   Musculoskeletal:  Positive for joint pain and stiffness. Negative for arthritis, back pain, gout, joint swelling and neck pain.   Gastrointestinal:  Negative for abdominal pain.   Genitourinary:  Negative for bladder incontinence.   Neurological:  Negative for numbness, paresthesias and sensory change.   Psychiatric/Behavioral:   Negative for altered mental status.      Objective:   Body mass index is 48.36 kg/m².  There were no vitals filed for this visit.       General    Constitutional: She is oriented to person, place, and time. She appears well-developed.   HENT:   Head: Atraumatic.   Eyes: EOM are normal.   Cardiovascular:  Normal rate.            Pulmonary/Chest: Effort normal.   Neurological: She is alert and oriented to person, place, and time.   Psychiatric: Judgment normal.         Ambulating with very slight limp.    Pelvis is level  Bilateral hips full motion no pain in the groin  Hip flexors, abductors, adductors, quads, hamstrings, ankle extensors and flexors are 5/5  Left knee with full motion no pain.  Collaterals and cruciates are stable.  No crepitus no warmness to touch no swelling  Right knee mild swelling.  Medial joint tenderness.  Positive Meghann sign medially.  There is some crepitus to compression on the patella.  There is a plica  you can feel on the lateral femoral condyle.  Collaterals and cruciates are stable.  Range of motion 0-120 degrees  Calves are soft nontender  Ankle motion is intact  Negative straight leg raising    Relevant imaging results reviewed and interpreted by me, discussed with the patient and / or family today   X-ray 03/16/2023 bilateral knees with the right knee still moderate medial joint narrowing similar to x-ray from 11/11/2021.  There is small marginal osteophytic changes.  The left knee seems to be very mild if any medial joint narrowing.  No osteophytes seen no fractures.  Quality of the bone is excellent  X-ray 11/11/2021 bilateral knees with the right knee moderate medial joint loss with marginal osteophytes most pronounced medially in and slightly anteriorly.  There is no evidence of fracture with mild effusion.  Left knee with very mild if any medial joint narrowing.  No fracture no osteophytes seen      Assessment:     Encounter Diagnoses   Name Primary?    Arthritis of knee,  right Yes    Acute medial meniscus tear of right knee, initial encounter         Plan:   Arthritis of knee, right  -     meloxicam (MOBIC) 15 MG tablet; Take 1 tablet (15 mg total) by mouth once daily. Take with food  Dispense: 30 tablet; Refill: 6    Acute medial meniscus tear of right knee, initial encounter         Patient Instructions   You are traveling to [place] and you would like to try meloxicam instead of ibuprofen  Will get you started 15 mg once a day as needed  If it does not work and you do not see no effect from it you can still go back taking ibuprofen 800 mg do not take it more than twice a day with food  You do not need any injections right now you doing okay we did give you the shot on 03/16/2023  If you anticipate that you going to do a lot of walking or work in the yd please take the meloxicam    I will see you back on October 12/4:00 p.m..  You are traveling to [place] at that time and will see you before you travel to see what else is going    The pros and cons of steroid injection discussed.  Also the pros and cons of NSAIDs/ibuprofen.  If she still having the catching feeling that she described we will obtain MRI on her right knee.  She is maintaining very active gym exercise program which is at this point sufficient and does not need to go to therapy.    Disclaimer: This note was prepared using a voice recognition system and is likely to have sound alike errors within the text.

## 2023-10-10 ENCOUNTER — PATIENT MESSAGE (OUTPATIENT)
Dept: ORTHOPEDICS | Facility: CLINIC | Age: 58
End: 2023-10-10
Payer: COMMERCIAL

## 2024-06-11 DIAGNOSIS — M25.562 PAIN IN BOTH KNEES, UNSPECIFIED CHRONICITY: Primary | ICD-10-CM

## 2024-06-11 DIAGNOSIS — M25.561 PAIN IN BOTH KNEES, UNSPECIFIED CHRONICITY: Primary | ICD-10-CM

## 2024-06-27 ENCOUNTER — OFFICE VISIT (OUTPATIENT)
Dept: ORTHOPEDICS | Facility: CLINIC | Age: 59
End: 2024-06-27
Payer: COMMERCIAL

## 2024-06-27 ENCOUNTER — HOSPITAL ENCOUNTER (OUTPATIENT)
Dept: RADIOLOGY | Facility: HOSPITAL | Age: 59
Discharge: HOME | End: 2024-06-27
Attending: ORTHOPAEDIC SURGERY
Payer: COMMERCIAL

## 2024-06-27 VITALS — HEIGHT: 63 IN | BODY MASS INDEX: 40.75 KG/M2 | WEIGHT: 230 LBS

## 2024-06-27 DIAGNOSIS — M25.562 PAIN IN BOTH KNEES, UNSPECIFIED CHRONICITY: ICD-10-CM

## 2024-06-27 DIAGNOSIS — M25.561 PAIN IN BOTH KNEES, UNSPECIFIED CHRONICITY: ICD-10-CM

## 2024-06-27 DIAGNOSIS — M17.11 ARTHRITIS OF KNEE, RIGHT: Primary | ICD-10-CM

## 2024-06-27 PROBLEM — J30.2 SEASONAL ALLERGIES: Status: ACTIVE | Noted: 2024-06-06

## 2024-06-27 PROBLEM — D25.9 UTERINE LEIOMYOMA: Status: ACTIVE | Noted: 2024-06-27

## 2024-06-27 PROCEDURE — 73564 X-RAY EXAM KNEE 4 OR MORE: CPT | Mod: TC,50

## 2024-06-27 PROCEDURE — 99999 PR PBB SHADOW E&M-EST. PATIENT-LVL III: CPT | Mod: PBBFAC,,, | Performed by: ORTHOPAEDIC SURGERY

## 2024-06-27 RX ORDER — METHYLPREDNISOLONE ACETATE 80 MG/ML
80 INJECTION, SUSPENSION INTRA-ARTICULAR; INTRALESIONAL; INTRAMUSCULAR; SOFT TISSUE
Status: DISCONTINUED | OUTPATIENT
Start: 2024-06-27 | End: 2024-06-27 | Stop reason: HOSPADM

## 2024-06-27 RX ADMIN — METHYLPREDNISOLONE ACETATE 80 MG: 80 INJECTION, SUSPENSION INTRA-ARTICULAR; INTRALESIONAL; INTRAMUSCULAR; SOFT TISSUE at 07:06

## 2024-06-27 NOTE — PROCEDURES
Large Joint Aspiration/Injection: R knee    Date/Time: 6/27/2024 7:40 AM    Performed by: Arden Sullivan MD  Authorized by: Arden Sullivan MD    Consent Done?:  Yes (Verbal)  Indications:  Arthritis  Site marked: the procedure site was marked    Timeout: prior to procedure the correct patient, procedure, and site was verified      Local anesthesia used?: Yes    Local anesthetic:  Lidocaine 1% without epinephrine    Details:  Needle Size:  22 G  Ultrasonic Guidance for needle placement?: No    Approach:  Anterolateral  Location:  Knee  Site:  R knee  Medications:  80 mg methylPREDNISolone acetate 80 mg/mL  Patient tolerance:  Patient tolerated the procedure well with no immediate complications

## 2024-06-27 NOTE — PROGRESS NOTES
Subjective:     Patient ID: Simi Constantino is a 58 y.o. female.    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  She used to fall a lot down stairs growing up.  At 1 point Dr[facility]Jared Raya at [facility]  injected her knee with good relief.  Now she is having something moving and catching inside her knee with some swelling.  Any time she tries to bend it she feels something moves in then it will go and get going.  She does go to 5 gym and does quite a bit of quad strengthening and hamstring strengthening exercises and she described which she does.  Now she is working for the [employer] as a nurse.  She took up to 1600 mg of ibuprofen a day to help ease it up and now she is down to 600 mg. She feels some tightness in the knee.  No history of numbness or tingling or back pain or history of gout    No fever no chills no shortness of breath or difficulty with chewing or swallowing loss of bowel bladder control blurry vision double vision or loss of sense smell or taste    03/10/2022  Right knee arthritis.  Last visit we injected her with Depo-Medrol with great relief.  She takes ibuprofen 600 sometimes 800 mg once a day.  Occasional Tylenol.  She still working.  Her pain around 3/10.  With activities it gets really bad.  She is maintaining quite active life and she does exercise.  No fever no chills no shortness of breath or difficulty with chewing or swallowing loss of bowel bladder control blurry vision double vision loss sense smell or taste.  The injection seems to have helped quite a bit that she is not feeling catching locking and we did not proceed with the MRI        12/05/2022   Right knee arthritis.  She stated the Depo-Medrol last time given hurt a lot that day but the next day the pain completely subsided.  She only takes ibuprofen 800 once a day once it wears off.  This time only wore off  like a month ago.  We did discuss taking Tylenol instead of ibuprofen to decrease the risk of gastrointestinal issues as well as kidney issues.  Her pain is 5/10 when she over does it.  She still working at the state.    No fever no chills no shortness of breath or difficulty with chewing swallowing loss of bowel bladder control.  There is no feeling of any catching locking at this time    03/16/2023  Right knee arthritis.  The injections seems to work.  We obtained new x-ray today to compared previously from 2021.  Looking at her records she probably received an injection of steroid twice a year.  She does take occasional ibuprofen but she is worried about developing problems from it.  If she takes maybe once a week and ibuprofen 600.  She is traveling sometime in October and I would like her to come in like 3 4 weeks prior to see if she would like steroid injection.  She is not at a point that she knees viscosupplementation.  She keeps active as a nurse.  Today her pain is 0-2/10 however was much higher a week ago it eased up a little bit.  No fever no chills no shortness of breath or difficulty with chewing swallowing loss of bowel bladder control blurry vision double vision loss sense smell or taste        07/06/2023   Right knee arthritis/medial meniscus tear.  Last visit on 03/16/2023 she received Depo-Medrol injection.  She occasionally takes ibuprofen 800 over-the-counter and or Tylenol.  She does not take it constantly.  The other day she stepped in a hole in the yd and started with pain.  She works all day long in the [employer] in the evening she gets pain.  We discussed meloxicam as alternative to ibuprofen and that she can not take it with the ibuprofen and she wants to try it.  She is planning on taking a trip to [place] and will require a lot of walking to words the later part of October and she wants to come in just in case she needs an injection prior to the trip.  Occasionally the knee becomes swollen and  tight and I did tell her that is the nature of the arthritis it comes and goes and sometimes might take a day or 2 before the swelling goes away.  Does days she needs to take the anti-inflammatory and/or Tylenol.  These stays are infrequent.  Pain today 0/10 doing really well    06/27/2024   Right knee arthritis/you meniscus tear.  Depo-Medrol 03/16/2023 seems to help.  She wants another injection.  She is taking ibuprofen on as needed basis and sometimes Tylenol.  She did go on her trip and enjoy did.  However recently she lost her mother who was another patient of mine.  She is still working and she is still ambulating without any assistive devices.  We did warn her about taking too much anti-inflammatories.    No fever no chills no shortness of breath or difficulty with chewing swallowing   In the sitting position her pain 0/10 when she is becomes more active it goes up to 3/10  X-ray obtained today  History reviewed. No pertinent past medical history.  History reviewed. No pertinent surgical history.  Family History   Problem Relation Name Age of Onset    No Known Problems Mother      No Known Problems Father      No Known Problems Sister      No Known Problems Brother      No Known Problems Maternal Grandmother      No Known Problems Maternal Grandfather      No Known Problems Paternal Grandmother      No Known Problems Paternal Grandfather       Social History     Socioeconomic History    Marital status:    Tobacco Use    Smoking status: Never    Smokeless tobacco: Never     Medication List with Changes/Refills   Current Medications    MELOXICAM (MOBIC) 15 MG TABLET    Take 1 tablet (15 mg total) by mouth once daily. Take with food    VITAMIN D (VITAMIN D3) 1000 UNITS TAB    Take 185 mg by mouth.   Discontinued Medications    ASPIRIN (ECOTRIN) 81 MG EC TABLET        GLUCOSAMINE/CHONDR MCKEON A SOD (OSTEO BI-FLEX ORAL)    Take by mouth.    IBUPROFEN (ADVIL,MOTRIN) 800 MG TABLET    800 MG  .     Review of  patient's allergies indicates:  No Known Allergies  Review of Systems   Constitutional: Negative for decreased appetite.   HENT:  Negative for tinnitus.    Eyes:  Negative for double vision.   Cardiovascular:  Negative for chest pain.   Respiratory:  Negative for wheezing.    Hematologic/Lymphatic: Negative for bleeding problem.   Skin:  Negative for dry skin.   Musculoskeletal:  Positive for joint pain and stiffness. Negative for arthritis, back pain, gout, joint swelling and neck pain.   Gastrointestinal:  Negative for abdominal pain.   Genitourinary:  Negative for bladder incontinence.   Neurological:  Negative for numbness, paresthesias and sensory change.   Psychiatric/Behavioral:  Negative for altered mental status.        Objective:   Body mass index is 40.74 kg/m².  There were no vitals filed for this visit.       General    Constitutional: She is oriented to person, place, and time. She appears well-developed.   HENT:   Head: Atraumatic.   Eyes: EOM are normal.   Cardiovascular:  Normal rate.            Pulmonary/Chest: Effort normal.   Neurological: She is alert and oriented to person, place, and time.   Psychiatric: Judgment normal.           Ambulating with very slight limp.    Pelvis is level  Bilateral hips full motion no pain in the groin  Hip flexors, abductors, adductors, quads, hamstrings, ankle extensors and flexors are 5/5  Left knee with full motion no pain.  Collaterals and cruciates are stable.  No crepitus no warmness to touch no swelling  Right knee mild swelling.  Medial joint tenderness.  Positive Meghann sign medially.  There is some crepitus to compression on the patella.  There is a plica  you can feel on the lateral femoral condyle.  Collaterals and cruciates are stable.  Range of motion 0-120 degrees  Calves are soft nontender  Ankle motion is intact  Negative straight leg raising    Relevant imaging results reviewed and interpreted by me, discussed with the patient and / or family  today     X-ray 06/27/2024 bilateral knees with the right knee almost complete loss of joint space which has progressed slightly since last year.  The left knee very mild degenerative changes.  No evidence of fracture  X-ray 03/16/2023 bilateral knees with the right knee still moderate medial joint narrowing similar to x-ray from 11/11/2021.  There is small marginal osteophytic changes.  The left knee seems to be very mild if any medial joint narrowing.  No osteophytes seen no fractures.  Quality of the bone is excellent  X-ray 11/11/2021 bilateral knees with the right knee moderate medial joint loss with marginal osteophytes most pronounced medially in and slightly anteriorly.  There is no evidence of fracture with mild effusion.  Left knee with very mild if any medial joint narrowing.  No fracture no osteophytes seen      Assessment:     Encounter Diagnosis   Name Primary?    Arthritis of knee, right Yes        Plan:   Arthritis of knee, right  -     Large Joint Aspiration/Injection: R knee         Patient Instructions   Proceed with icing the knee on the right side   Injected the knee with 80 mg Depo-Medrol mixed with 5 cc 1% lidocaine 06/27/2024   You may take Tylenol as needed and NSAIDs over-the-counter like ibuprofen 600 mg not to exceed 3 times a day    The pros and cons of steroid injection discussed.  Also the pros and cons of NSAIDs/ibuprofen.  If she still having the catching feeling that she described we will obtain MRI on her right knee.  She is maintaining very active gym exercise program which is at this point sufficient and does not need to go to therapy.    Disclaimer: This note was prepared using a voice recognition system and is likely to have sound alike errors within the text.

## 2024-06-27 NOTE — PATIENT INSTRUCTIONS
Proceed with icing the knee on the right side   Injected the knee with 80 mg Depo-Medrol mixed with 5 cc 1% lidocaine 06/27/2024   You may take Tylenol as needed and NSAIDs over-the-counter like ibuprofen 600 mg not to exceed 3 times a day

## 2024-08-19 DIAGNOSIS — M17.11 ARTHRITIS OF KNEE, RIGHT: ICD-10-CM

## 2024-08-19 RX ORDER — MELOXICAM 15 MG/1
15 TABLET ORAL DAILY
Qty: 30 TABLET | Refills: 6 | Status: SHIPPED | OUTPATIENT
Start: 2024-08-19

## 2024-10-03 ENCOUNTER — PATIENT MESSAGE (OUTPATIENT)
Dept: ORTHOPEDICS | Facility: CLINIC | Age: 59
End: 2024-10-03
Payer: COMMERCIAL

## 2024-10-28 ENCOUNTER — OFFICE VISIT (OUTPATIENT)
Dept: ORTHOPEDICS | Facility: CLINIC | Age: 59
End: 2024-10-28
Payer: COMMERCIAL

## 2024-10-28 VITALS — WEIGHT: 226 LBS | BODY MASS INDEX: 40.04 KG/M2 | HEIGHT: 63 IN

## 2024-10-28 DIAGNOSIS — M17.11 ARTHRITIS OF KNEE, RIGHT: Primary | ICD-10-CM

## 2024-10-28 DIAGNOSIS — M21.161 ACQUIRED VARUS DEFORMITY KNEE, RIGHT: ICD-10-CM

## 2024-10-28 DIAGNOSIS — M94.262 CHONDROMALACIA, LEFT KNEE: ICD-10-CM

## 2024-10-28 PROCEDURE — 99213 OFFICE O/P EST LOW 20 MIN: CPT | Mod: 25,S$GLB,, | Performed by: ORTHOPAEDIC SURGERY

## 2024-10-28 PROCEDURE — 1159F MED LIST DOCD IN RCRD: CPT | Mod: CPTII,S$GLB,, | Performed by: ORTHOPAEDIC SURGERY

## 2024-10-28 PROCEDURE — 3008F BODY MASS INDEX DOCD: CPT | Mod: CPTII,S$GLB,, | Performed by: ORTHOPAEDIC SURGERY

## 2024-10-28 PROCEDURE — 20610 DRAIN/INJ JOINT/BURSA W/O US: CPT | Mod: RT,S$GLB,, | Performed by: ORTHOPAEDIC SURGERY

## 2024-10-28 PROCEDURE — 99999 PR PBB SHADOW E&M-EST. PATIENT-LVL III: CPT | Mod: PBBFAC,,, | Performed by: ORTHOPAEDIC SURGERY

## 2024-10-28 RX ORDER — TRIAMCINOLONE ACETONIDE 40 MG/ML
80 INJECTION, SUSPENSION INTRA-ARTICULAR; INTRAMUSCULAR
Status: DISCONTINUED | OUTPATIENT
Start: 2024-10-28 | End: 2024-10-28 | Stop reason: HOSPADM

## 2024-10-28 RX ORDER — DICLOFENAC SODIUM 75 MG/1
75 TABLET, DELAYED RELEASE ORAL 2 TIMES DAILY PRN
Qty: 60 TABLET | Refills: 6 | Status: SHIPPED | OUTPATIENT
Start: 2024-10-28

## 2024-10-28 RX ADMIN — TRIAMCINOLONE ACETONIDE 80 MG: 40 INJECTION, SUSPENSION INTRA-ARTICULAR; INTRAMUSCULAR at 04:10

## 2024-12-16 ENCOUNTER — PATIENT MESSAGE (OUTPATIENT)
Dept: ORTHOPEDICS | Facility: CLINIC | Age: 59
End: 2024-12-16

## 2024-12-16 ENCOUNTER — OFFICE VISIT (OUTPATIENT)
Dept: ORTHOPEDICS | Facility: CLINIC | Age: 59
End: 2024-12-16
Payer: COMMERCIAL

## 2024-12-16 VITALS — WEIGHT: 226 LBS | HEIGHT: 63 IN | BODY MASS INDEX: 40.04 KG/M2

## 2024-12-16 DIAGNOSIS — M94.262 CHONDROMALACIA, LEFT KNEE: ICD-10-CM

## 2024-12-16 DIAGNOSIS — M17.11 ARTHRITIS OF KNEE, RIGHT: Primary | ICD-10-CM

## 2024-12-16 DIAGNOSIS — M21.161 ACQUIRED VARUS DEFORMITY KNEE, RIGHT: ICD-10-CM

## 2024-12-16 PROCEDURE — 99214 OFFICE O/P EST MOD 30 MIN: CPT | Mod: S$GLB,,, | Performed by: ORTHOPAEDIC SURGERY

## 2024-12-16 PROCEDURE — 99999 PR PBB SHADOW E&M-EST. PATIENT-LVL III: CPT | Mod: PBBFAC,,, | Performed by: ORTHOPAEDIC SURGERY

## 2024-12-16 PROCEDURE — 3008F BODY MASS INDEX DOCD: CPT | Mod: CPTII,S$GLB,, | Performed by: ORTHOPAEDIC SURGERY

## 2024-12-16 PROCEDURE — 1159F MED LIST DOCD IN RCRD: CPT | Mod: CPTII,S$GLB,, | Performed by: ORTHOPAEDIC SURGERY

## 2024-12-16 NOTE — PROGRESS NOTES
Subjective:     Patient ID: Simi Constantino is a 59 y.o. female.    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  She used to fall a lot down stairs growing up.  At 1 point Dr.Jared Raya at [facility]  injected her knee with good relief.  Now she is having something moving and catching inside her knee with some swelling.  Any time she tries to bend it she feels something moves in then it will go and get going.  She does go to 5 gym and does quite a bit of quad strengthening and hamstring strengthening exercises and she described which she does.  Now she is working for the [employer] as a nurse.  She took up to 1600 mg of ibuprofen a day to help ease it up and now she is down to 600 mg. She feels some tightness in the knee.  No history of numbness or tingling or back pain or history of gout    No fever no chills no shortness of breath or difficulty with chewing or swallowing loss of bowel bladder control blurry vision double vision or loss of sense smell or taste    03/10/2022  Right knee arthritis.  Last visit we injected her with Depo-Medrol with great relief.  She takes ibuprofen 600 sometimes 800 mg once a day.  Occasional Tylenol.  She still working.  Her pain around 3/10.  With activities it gets really bad.  She is maintaining quite active life and she does exercise.  No fever no chills no shortness of breath or difficulty with chewing or swallowing loss of bowel bladder control blurry vision double vision loss sense smell or taste.  The injection seems to have helped quite a bit that she is not feeling catching locking and we did not proceed with the MRI        12/05/2022   Right knee arthritis.  She stated the Depo-Medrol last time given hurt a lot that day but the next day the pain completely subsided.  She only takes ibuprofen 800 once a day once it wears off.  This time only wore off  like a month ago.  We did discuss taking Tylenol instead of ibuprofen to decrease the risk of gastrointestinal issues as well as kidney issues.  Her pain is 5/10 when she over does it.  She still working at the state.    No fever no chills no shortness of breath or difficulty with chewing swallowing loss of bowel bladder control.  There is no feeling of any catching locking at this time    03/16/2023  Right knee arthritis.  The injections seems to work.  We obtained new x-ray today to compared previously from 2021.  Looking at her records she probably received an injection of steroid twice a year.  She does take occasional ibuprofen but she is worried about developing problems from it.  If she takes maybe once a week and ibuprofen 600.  She is traveling sometime in October and I would like her to come in like 3 4 weeks prior to see if she would like steroid injection.  She is not at a point that she knees viscosupplementation.  She keeps active as a nurse.  Today her pain is 0-2/10 however was much higher a week ago it eased up a little bit.  No fever no chills no shortness of breath or difficulty with chewing swallowing loss of bowel bladder control blurry vision double vision loss sense smell or taste        07/06/2023   Right knee arthritis/medial meniscus tear.  Last visit on 03/16/2023 she received Depo-Medrol injection.  She occasionally takes ibuprofen 800 over-the-counter and or Tylenol.  She does not take it constantly.  The other day she stepped in a hole in the yd and started with pain.  She works all day long in the [employer] in the evening she gets pain.  We discussed meloxicam as alternative to ibuprofen and that she can not take it with the ibuprofen and she wants to try it.  She is planning on taking a trip to [place] and will require a lot of walking to words the later part of October and she wants to come in just in case she needs an injection prior to the trip.  Occasionally the knee becomes swollen and  tight and I did tell her that is the nature of the arthritis it comes and goes and sometimes might take a day or 2 before the swelling goes away.  Does days she needs to take the anti-inflammatory and/or Tylenol.  These stays are infrequent.  Pain today 0/10 doing really well    06/27/2024   Right knee arthritis/you meniscus tear.  Depo-Medrol 03/16/2023 seems to help.  She wants another injection.  She is taking ibuprofen on as needed basis and sometimes Tylenol.  She did go on her trip and enjoy did.  However recently she lost her mother who was another patient of mine.  She is still working and she is still ambulating without any assistive devices.  We did warn her about taking too much anti-inflammatories.    No fever no chills no shortness of breath or difficulty with chewing swallowing   In the sitting position her pain 0/10 when she is becomes more active it goes up to 3/10  X-ray obtained today    10/28/2024   Right knee arthritis with varus deformity went over the x-rays from last visit with her.  Showed her that she has bone-on-bone right now in the right knee.  The left knee she has started with osteophyte underneath the patella very mild narrowing medially.  Depo-Medrol given 06/27/2024 seems to only helped for around 3 weeks.  She has been suffering after that.  The ibuprofen gave him severe bruising then she was switch to meloxicam and seems not helping.  Her knee is catching locking popping swelling up.  It is so painful at this time.  She has feels that the knee is weak.  She is using Voltaren gel topical.  No fever no chills no shortness of breath or difficulty with chewing swallowing loss of sense smell or taste    12/16/2024   Right knee severe arthritis and varus deformity.  She stated that physical therapy at [facility] helped quite a bit making her stronger.  She is able to do leg lifts in the exercises much better than before.  She said however she is having quite severe of pain and catching on  the inside of the knee and grinding underneath the kneecap.  Standing for any period more than 30 minutes seems to be bothering her quite a bit any twisting motion with severe catching.  Pain 5/10.  Had tried right knee Depo-Medrol 06/27/2024 and right knee Kenalog 10/28/2024 which helped significantly for 10 days.  Physical therapy helps but it makes her hurt later on but she felt that it did help with the strength in the walking.  She is not limping as much as used to be  She came to terms that she had tried numerous NSAIDs in the past and will not like proceed with total knee replacement in February 20, 2025.  We went over total knee replacement in details went over the x-rays.  Went over the pros and cons of surgery and the instructions.  Now she works as a  on a computer sitting down.  I did tell her most people would be out of work for 3 months but if she is doing well and you wants to return within 4 weeks I see no objections     She is not allergic to any medications at this time.  She can wear fake jewelry without problems  History reviewed. No pertinent past medical history.  History reviewed. No pertinent surgical history.  Family History   Problem Relation Name Age of Onset    No Known Problems Mother      No Known Problems Father      No Known Problems Sister      No Known Problems Brother      No Known Problems Maternal Grandmother      No Known Problems Maternal Grandfather      No Known Problems Paternal Grandmother      No Known Problems Paternal Grandfather       Social History     Socioeconomic History    Marital status:    Tobacco Use    Smoking status: Never    Smokeless tobacco: Never     Medication List with Changes/Refills   Current Medications    DICLOFENAC (VOLTAREN) 75 MG EC TABLET    Take 1 tablet (75 mg total) by mouth 2 (two) times daily as needed (Pain). Take with food    MELOXICAM (MOBIC) 15 MG TABLET    Take 1 tablet (15 mg total) by mouth once daily. Take with  food    VITAMIN D (VITAMIN D3) 1000 UNITS TAB    Take 185 mg by mouth.     Review of patient's allergies indicates:  No Known Allergies  Review of Systems   Constitutional: Negative for decreased appetite.   HENT:  Negative for tinnitus.    Eyes:  Negative for double vision.   Cardiovascular:  Negative for chest pain.   Respiratory:  Negative for wheezing.    Hematologic/Lymphatic: Negative for bleeding problem.   Skin:  Negative for dry skin.   Musculoskeletal:  Positive for joint pain, joint swelling and stiffness. Negative for arthritis, back pain, gout and neck pain.   Gastrointestinal:  Negative for abdominal pain.   Genitourinary:  Negative for bladder incontinence.   Neurological:  Negative for numbness, paresthesias and sensory change.   Psychiatric/Behavioral:  Negative for altered mental status.        Objective:   Body mass index is 40.03 kg/m².  There were no vitals filed for this visit.       General    Constitutional: She is oriented to person, place, and time. She appears well-developed.   HENT:   Head: Atraumatic.   Eyes: EOM are normal.   Cardiovascular:  Normal rate.            Pulmonary/Chest: Effort normal.   Neurological: She is alert and oriented to person, place, and time.   Psychiatric: Judgment normal.           Ambulating with very slight limp.    Pelvis is level  Bilateral hips full motion no pain in the groin  Hip flexors, abductors, adductors, quads, hamstrings, ankle extensors and flexors are 5/5  Left knee with full motion no pain.  Collaterals and cruciates are stable.  Mild crepitus anteriorly, no warmness to touch no swelling.  No defect in the patella or quadriceps tendon  Right knee mild swelling.  Medial joint pain to range of motion and to palpation.  Positive Meghann sign medially.  There is some crepitus to compression on the patella.  There is a plica  you can feel on the lateral femoral condyle.  Collaterals and cruciates are stable.  Range of motion 0-120 degrees.  7° of  varus deformity.  No defect in the patella or quadriceps tendon  Calves are soft nontender  Ankle motion is intact able to move the ankle up and down on the right  Negative straight leg raising    Relevant imaging results reviewed and interpreted by me, discussed with the patient and / or family today     X-ray 06/27/2024 bilateral knees with the right knee  complete loss of joint space which has progressed since last year.  Sclerosis of the bone small cystic changes and varus deformity.  The left knee very mild degenerative changes.  No evidence of fracture  X-ray 03/16/2023 bilateral knees with the right knee still moderate medial joint narrowing similar to x-ray from 11/11/2021.  There is small marginal osteophytic changes.  The left knee seems to be very mild if any medial joint narrowing.  No osteophytes seen no fractures.  Quality of the bone is excellent  X-ray 11/11/2021 bilateral knees with the right knee moderate medial joint loss with marginal osteophytes most pronounced medially in and slightly anteriorly.  There is no evidence of fracture with mild effusion.  Left knee with very mild if any medial joint narrowing.  No fracture no osteophytes seen      Assessment:     Encounter Diagnoses   Name Primary?    Arthritis of knee, right Yes    Acquired varus deformity knee, right     Chondromalacia, left knee           Plan:   Arthritis of knee, right    Acquired varus deformity knee, right    Chondromalacia, left knee           Patient Instructions   Have bone-on-bone on the inside of her knee   Your gone through physical therapy at [facility] leg became very strong but the pain on the inside of the knee is severe specially when you stand any period of time 30 minutes and you are.  Hurting  You had tried numerous NSAIDs in the past   You now you taking only Tylenol and Osteo Bi-Flex  We tried numerous injections from Depo-Medrol on 06/27/2024 and Kenalog on 10/28/2024 with limited time of relief  You want a  proceed with the right total knee replacement in February 20, 2025   Total knee replacement is considered outpatient surgery by the government that means we will do the surgery you go home the same day.  We will arrange for home physical therapy for 2 weeks and then we start outpatient physical therapy after that.  It will take roughly 3-6 months for complete healing to occur.  Total knee replacement will improve also with time up to 18 months after surgery.  The success depends on doing the exercise and therapy.  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  If you doing really well we can let you return to work within 4 weeks    Procedure, common risks and benefits,alternatives discussed in details.All questions answered.Patient expressed understanding.Patient instructed to call for any questions that could arise in the future.    Most common Risks:  Infection is less than 2% chance  Leg-length discrepancy    Neuro-vascular injury ( resulting in loss of motor and sensory functions)/almost all total knee replacements are slightly numb on the outside of the knee.  Very rarely we get someone with a footdrop.  80% of foot drop recuperate within 6 months to a year in other words you can not bring her foot down but not up if that happens we give you a brace to put in the shoe.  Pain  Blood clot  Fat clot  Loss of motion/we heal with scar tissue your job is to do extensive exercise to get the knee to move so it does not scar down and prevent you from moving  Fracture of bone  Failure of procedure to achieve its intended purpose/total knee replacement is not perfect it is 80% successful in decreasing pain and increasing function  Failure of hardware/total knee replacement made of metal and plastic they should last on the average 15 years  Non-union or mal-union of bone  Malalignment  Death/we have you see cardiology to make sure you heart could withstand the operation without  problems    Patient instructions for joint replacement    Before surgery  1.  Shower with Hibiclens soap/antibacterial for 3 days prior to surgery to decrease risk of infection  2..  Stop all blood thinners/aspirin, Coumadin, warfarin, Plavix, Eliquis, Xarelto etc 5 days prior to surgery/it includes anti-inflammatories like a leave or Motrin or ibuprofen as well as your Osteo Bi-Flex.  You need to stop all other medications over-the-counter except vitamin-D.  If you are on Wegovy or Ozempic you have to stop that is 7 days prior  3.  No eating or drinking after midnight the night before surgery.  We would like you to drink a bottle of Gatorade or Powerade or Pedialyte the night before surgery prior to midnight so you do not get dehydrated waiting to undergo surgery the next day  4.  Take Tylenol 650 mg the night prior to surgery    Ask physicians for prescription of Celebrex or Mobic if needed    After surgery at home  1.  Take Tylenol 650 mg 3 times a day for 14 days then as needed for mild pain  2.  Take gabapentin 300 mg nightly for 6 weeks  3.  Take Celebrex 200 mg or meloxicam 15 mg daily for 6 weeks unless having cardiac issues to take for 2 weeks only  4.  Must take aspirin 81 mg twice a day for 6 weeks unless you are on other blood thinners/Plavix, Eliquis, Xarelto, Coumadin etc  5.  Must wear compressive stockings for 6 weeks minimum to decrease the risk of blood clot and swelling  6.  Hydrocodone/Norco or oxycodone/Percocet will be prescribed to take every 6 hr as needed for breakthrough pain  7.  May apply ice on the knee to help with decreasing pain  8.  Keep wound dry for 2 weeks until stitches/staples removed than you will be allowed to shower in 24 hr and get the wound wet.  No soaking of the wound in the tub or swimming for 4 weeks after surgery  9.  No driving for 4 weeks unless specified by physician  10.  Avoid touching the wound or surrounding area /at least 2 inches on each side of the surgical  incision until staples are removed/stitches   11.  May change the surgical dressing if extremely bloody or has drainage on it. May clean the wound with peroxide or Betadine and apply sterile dressing and Ace wrap over it  12.  Leave hospital dressing on for 3 days then may change by applying sterile 4 x 4 and Ace wrap after cleaning with Betadine or peroxide.  May leave this dressing change to home health nurses      Not allergic to metal       The mobility limitation can not be sufficiently resolved by the use of a cane.  Patient's functional mobility deficit can be sufficiently resolved with the use of a rolling walker.  Patient has mobility limitation significantly impairs their ability to participate in 1 or more activities of daily living.  The use of a rolling walker we will significantly improve the patient's ability to participate in  MRADLS and it is to be used on a regular basis in the home.              The pros and cons of steroid injection discussed.  Also the pros and cons of NSAIDs/ibuprofen/meloxicam/Voltaren.       Disclaimer: This note was prepared using a voice recognition system and is likely to have sound alike errors within the text.

## 2024-12-16 NOTE — PATIENT INSTRUCTIONS
Have bone-on-bone on the inside of her knee   Your gone through physical therapy at [facility] PT leg became very strong but the pain on the inside of the knee is severe specially when you stand any period of time 30 minutes and you are.  Hurting  You had tried numerous NSAIDs in the past   You now you taking only Tylenol and Osteo Bi-Flex  We tried numerous injections from Depo-Medrol on 06/27/2024 and Kenalog on 10/28/2024 with limited time of relief  You want a proceed with the right total knee replacement in February 20, 2025   Total knee replacement is considered outpatient surgery by the government that means we will do the surgery you go home the same day.  We will arrange for home physical therapy for 2 weeks and then we start outpatient physical therapy after that.  It will take roughly 3-6 months for complete healing to occur.  Total knee replacement will improve also with time up to 18 months after surgery.  The success depends on doing the exercise and therapy.  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  If you doing really well we can let you return to work within 4 weeks    Procedure, common risks and benefits,alternatives discussed in details.All questions answered.Patient expressed understanding.Patient instructed to call for any questions that could arise in the future.    Most common Risks:  Infection is less than 2% chance  Leg-length discrepancy    Neuro-vascular injury ( resulting in loss of motor and sensory functions)/almost all total knee replacements are slightly numb on the outside of the knee.  Very rarely we get someone with a footdrop.  80% of foot drop recuperate within 6 months to a year in other words you can not bring her foot down but not up if that happens we give you a brace to put in the shoe.  Pain  Blood clot  Fat clot  Loss of motion/we heal with scar tissue your job is to do extensive exercise to get the knee to move so it does not scar down  and prevent you from moving  Fracture of bone  Failure of procedure to achieve its intended purpose/total knee replacement is not perfect it is 80% successful in decreasing pain and increasing function  Failure of hardware/total knee replacement made of metal and plastic they should last on the average 15 years  Non-union or mal-union of bone  Malalignment  Death/we have you see cardiology to make sure you heart could withstand the operation without problems    Patient instructions for joint replacement    Before surgery  1.  Shower with Hibiclens soap/antibacterial for 3 days prior to surgery to decrease risk of infection  2..  Stop all blood thinners/aspirin, Coumadin, warfarin, Plavix, Eliquis, Xarelto etc 5 days prior to surgery/it includes anti-inflammatories like a leave or Motrin or ibuprofen as well as your Osteo Bi-Flex.  You need to stop all other medications over-the-counter except vitamin-D.  If you are on Wegovy or Ozempic you have to stop that is 7 days prior  3.  No eating or drinking after midnight the night before surgery.  We would like you to drink a bottle of Gatorade or Powerade or Pedialyte the night before surgery prior to midnight so you do not get dehydrated waiting to undergo surgery the next day  4.  Take Tylenol 650 mg the night prior to surgery    Ask physicians for prescription of Celebrex or Mobic if needed    After surgery at home  1.  Take Tylenol 650 mg 3 times a day for 14 days then as needed for mild pain  2.  Take gabapentin 300 mg nightly for 6 weeks  3.  Take Celebrex 200 mg or meloxicam 15 mg daily for 6 weeks unless having cardiac issues to take for 2 weeks only  4.  Must take aspirin 81 mg twice a day for 6 weeks unless you are on other blood thinners/Plavix, Eliquis, Xarelto, Coumadin etc  5.  Must wear compressive stockings for 6 weeks minimum to decrease the risk of blood clot and swelling  6.  Hydrocodone/Norco or oxycodone/Percocet will be prescribed to take every 6 hr  as needed for breakthrough pain  7.  May apply ice on the knee to help with decreasing pain  8.  Keep wound dry for 2 weeks until stitches/staples removed than you will be allowed to shower in 24 hr and get the wound wet.  No soaking of the wound in the tub or swimming for 4 weeks after surgery  9.  No driving for 4 weeks unless specified by physician  10.  Avoid touching the wound or surrounding area /at least 2 inches on each side of the surgical incision until staples are removed/stitches   11.  May change the surgical dressing if extremely bloody or has drainage on it. May clean the wound with peroxide or Betadine and apply sterile dressing and Ace wrap over it  12.  Leave hospital dressing on for 3 days then may change by applying sterile 4 x 4 and Ace wrap after cleaning with Betadine or peroxide.  May leave this dressing change to home health nurses      Not allergic to metal       The mobility limitation can not be sufficiently resolved by the use of a cane.  Patient's functional mobility deficit can be sufficiently resolved with the use of a rolling walker.  Patient has mobility limitation significantly impairs their ability to participate in 1 or more activities of daily living.  The use of a rolling walker we will significantly improve the patient's ability to participate in  MRADLS and it is to be used on a regular basis in the home.

## 2025-01-03 ENCOUNTER — PATIENT MESSAGE (OUTPATIENT)
Dept: ORTHOPEDICS | Facility: CLINIC | Age: 60
End: 2025-01-03
Payer: COMMERCIAL

## 2025-01-03 DIAGNOSIS — Z01.818 PREOPERATIVE EXAMINATION: ICD-10-CM

## 2025-01-03 DIAGNOSIS — M17.11 ARTHRITIS OF KNEE, RIGHT: Primary | ICD-10-CM

## 2025-01-03 DIAGNOSIS — Z01.818 PREOP TESTING: ICD-10-CM

## 2025-01-08 ENCOUNTER — PATIENT MESSAGE (OUTPATIENT)
Dept: ORTHOPEDICS | Facility: CLINIC | Age: 60
End: 2025-01-08
Payer: COMMERCIAL

## 2025-01-10 DIAGNOSIS — I10 HYPERTENSION, UNSPECIFIED TYPE: Primary | ICD-10-CM

## 2025-01-13 ENCOUNTER — HOSPITAL ENCOUNTER (OUTPATIENT)
Dept: CARDIOLOGY | Facility: HOSPITAL | Age: 60
Discharge: HOME | End: 2025-01-13
Attending: INTERNAL MEDICINE
Payer: COMMERCIAL

## 2025-01-13 ENCOUNTER — OFFICE VISIT (OUTPATIENT)
Dept: CARDIOLOGY | Facility: CLINIC | Age: 60
End: 2025-01-13
Payer: COMMERCIAL

## 2025-01-13 VITALS
OXYGEN SATURATION: 99 % | HEART RATE: 74 BPM | SYSTOLIC BLOOD PRESSURE: 120 MMHG | DIASTOLIC BLOOD PRESSURE: 78 MMHG | BODY MASS INDEX: 39.91 KG/M2 | WEIGHT: 225.31 LBS

## 2025-01-13 DIAGNOSIS — E78.1 HYPERTRIGLYCERIDEMIA: Primary | ICD-10-CM

## 2025-01-13 DIAGNOSIS — I10 HYPERTENSION, UNSPECIFIED TYPE: ICD-10-CM

## 2025-01-13 DIAGNOSIS — Z01.810 PREOP CARDIOVASCULAR EXAM: ICD-10-CM

## 2025-01-13 LAB
OHS QRS DURATION: 68 MS
OHS QTC CALCULATION: 411 MS

## 2025-01-13 PROCEDURE — 3008F BODY MASS INDEX DOCD: CPT | Mod: CPTII,S$GLB,, | Performed by: INTERNAL MEDICINE

## 2025-01-13 PROCEDURE — 99205 OFFICE O/P NEW HI 60 MIN: CPT | Mod: S$GLB,,, | Performed by: INTERNAL MEDICINE

## 2025-01-13 PROCEDURE — 3078F DIAST BP <80 MM HG: CPT | Mod: CPTII,S$GLB,, | Performed by: INTERNAL MEDICINE

## 2025-01-13 PROCEDURE — 99999 PR PBB SHADOW E&M-EST. PATIENT-LVL III: CPT | Mod: PBBFAC,,, | Performed by: INTERNAL MEDICINE

## 2025-01-13 PROCEDURE — 3074F SYST BP LT 130 MM HG: CPT | Mod: CPTII,S$GLB,, | Performed by: INTERNAL MEDICINE

## 2025-01-13 PROCEDURE — 93010 ELECTROCARDIOGRAM REPORT: CPT | Mod: ,,, | Performed by: INTERNAL MEDICINE

## 2025-01-13 PROCEDURE — 1159F MED LIST DOCD IN RCRD: CPT | Mod: CPTII,S$GLB,, | Performed by: INTERNAL MEDICINE

## 2025-01-13 PROCEDURE — 93005 ELECTROCARDIOGRAM TRACING: CPT

## 2025-01-13 NOTE — PROGRESS NOTES
Subjective:   Patient ID:  Simi Constantino is a 59 y.o. female who presents for follow-up of Pre-op Exam  Pt presents for preop Cv exam. Pt planned for R knee replacement.  Patient denies CP, angina or anginal equivalent.EKG NSR poor r wave progression.  Last surgery hysterectomy 2016 Colonoscopy 2024.  Pt active though R knee pain. Pt with physical therapy x 2 months, no sx.    CRF- age/female, weight, HLP    Pain  This is a chronic problem. The current episode started more than 1 month ago. The problem occurs intermittently. The problem has been waxing and waning. Pertinent negatives include no chest pain. The symptoms are aggravated by walking. She has tried rest for the symptoms. The treatment provided mild relief.     Hyperlipidemia  This is a chronic problem. The current episode started more than 1 year ago. The problem is controlled. Pertinent negatives include no chest pain or shortness of breath. Current antihyperlipidemic treatment includes statins. The current treatment provides moderate improvement of diet. There are no compliance problems.   Review of Systems   Constitutional: Negative. Negative for weight gain.   HENT: Negative.     Eyes: Negative.    Cardiovascular: Negative.  Negative for chest pain, leg swelling and palpitations.   Respiratory: Negative.  Negative for shortness of breath.    Endocrine: Negative.    Hematologic/Lymphatic: Negative.    Skin: Negative.    Musculoskeletal:  Negative for muscle weakness.   Gastrointestinal: Negative.    Genitourinary: Negative.    Neurological: Negative.  Negative for dizziness.   Psychiatric/Behavioral: Negative.     Allergic/Immunologic: Negative.    All other systems reviewed and are negative.    Family History   Problem Relation Name Age of Onset    No Known Problems Mother      No Known Problems Father      No Known Problems Sister      No Known Problems Brother      No Known Problems Maternal Grandmother      No Known Problems Maternal Grandfather       No Known Problems Paternal Grandmother      No Known Problems Paternal Grandfather       History reviewed. No pertinent past medical history.  Social History     Socioeconomic History    Marital status:    Tobacco Use    Smoking status: Never    Smokeless tobacco: Never     Current Outpatient Medications on File Prior to Visit   Medication Sig Dispense Refill    vitamin D (VITAMIN D3) 1000 units Tab Take 185 mg by mouth.      diclofenac (VOLTAREN) 75 MG EC tablet Take 1 tablet (75 mg total) by mouth 2 (two) times daily as needed (Pain). Take with food (Patient not taking: Reported on 1/13/2025) 60 tablet 6    meloxicam (MOBIC) 15 MG tablet Take 1 tablet (15 mg total) by mouth once daily. Take with food (Patient not taking: Reported on 1/13/2025) 30 tablet 6     No current facility-administered medications on file prior to visit.     Review of patient's allergies indicates:  No Known Allergies    Objective:     Physical Exam  Vitals and nursing note reviewed.   Constitutional:       Appearance: She is well-developed.   HENT:      Head: Normocephalic and atraumatic.   Eyes:      Conjunctiva/sclera: Conjunctivae normal.      Pupils: Pupils are equal, round, and reactive to light.   Cardiovascular:      Rate and Rhythm: Normal rate and regular rhythm.      Pulses: Intact distal pulses.      Heart sounds: Normal heart sounds.   Pulmonary:      Effort: Pulmonary effort is normal.      Breath sounds: Normal breath sounds.   Abdominal:      General: Bowel sounds are normal.      Palpations: Abdomen is soft.   Musculoskeletal:         General: Normal range of motion.      Cervical back: Normal range of motion and neck supple.   Skin:     General: Skin is warm and dry.   Neurological:      Mental Status: She is alert and oriented to person, place, and time.         Assessment:     1. Hypertriglyceridemia    2. Preop cardiovascular exam    3. BMI 40.0-44.9, adult        Plan:     Hypertriglyceridemia    Preop  cardiovascular exam    BMI 40.0-44.9, adult      Pt cleared for procedure/surgery at moderate CV risk   Check lipids

## 2025-01-21 ENCOUNTER — PATIENT MESSAGE (OUTPATIENT)
Dept: ORTHOPEDICS | Facility: CLINIC | Age: 60
End: 2025-01-21
Payer: COMMERCIAL

## 2025-01-28 ENCOUNTER — OFFICE VISIT (OUTPATIENT)
Dept: PODIATRY | Facility: CLINIC | Age: 60
End: 2025-01-28
Payer: COMMERCIAL

## 2025-01-28 DIAGNOSIS — B35.1 ONYCHOMYCOSIS DUE TO DERMATOPHYTE: Primary | ICD-10-CM

## 2025-01-28 PROCEDURE — 99999 PR PBB SHADOW E&M-EST. PATIENT-LVL II: CPT | Mod: PBBFAC,,, | Performed by: PODIATRIST

## 2025-01-28 PROCEDURE — 1159F MED LIST DOCD IN RCRD: CPT | Mod: CPTII,S$GLB,, | Performed by: PODIATRIST

## 2025-01-28 PROCEDURE — 1160F RVW MEDS BY RX/DR IN RCRD: CPT | Mod: CPTII,S$GLB,, | Performed by: PODIATRIST

## 2025-01-28 PROCEDURE — 99204 OFFICE O/P NEW MOD 45 MIN: CPT | Mod: S$GLB,,, | Performed by: PODIATRIST

## 2025-01-28 RX ORDER — TERBINAFINE HYDROCHLORIDE 250 MG/1
250 TABLET ORAL DAILY
Qty: 90 TABLET | Refills: 0 | Status: SHIPPED | OUTPATIENT
Start: 2025-01-28 | End: 2025-04-28

## 2025-01-28 NOTE — PROGRESS NOTES
Subjective:       Patient ID: Simi Constantino is a 59 y.o. female.    Chief Complaint: Tinea Pedis (Possible toe fungus, no pain at present, nondiabetic, )    HPI:  Patient presents to the office this afternoon, with complaint of elongated and thickened nail plates to the left and right. The patient states slight discomfort due to the nail thickening and/or elongation.  Patient is interested in the definitive medical diagnosis. Symptoms are exacerbated with tight shoe gear.  Pains are approximately 1/10. This patient's PMD is Unable, To Obtain. Is pending knee replacement and wants to be sure of no bacterial infection.     Review of patient's allergies indicates:  No Known Allergies    No past medical history on file.    Family History   Problem Relation Name Age of Onset    No Known Problems Mother      No Known Problems Father      No Known Problems Sister      No Known Problems Brother      No Known Problems Maternal Grandmother      No Known Problems Maternal Grandfather      No Known Problems Paternal Grandmother      No Known Problems Paternal Grandfather         Social History     Socioeconomic History    Marital status:    Tobacco Use    Smoking status: Never    Smokeless tobacco: Never       No past surgical history on file.    Review of Systems   Constitutional:  Negative for chills, fatigue and fever.   HENT:  Negative for hearing loss.    Eyes:  Negative for photophobia and visual disturbance.   Respiratory:  Negative for cough, chest tightness, shortness of breath and wheezing.    Cardiovascular:  Negative for chest pain and palpitations.   Gastrointestinal:  Negative for constipation, diarrhea, nausea and vomiting.   Endocrine: Negative for cold intolerance and heat intolerance.   Genitourinary:  Negative for flank pain.   Musculoskeletal:  Negative for neck pain and neck stiffness.   Neurological:  Negative for light-headedness and headaches.   Psychiatric/Behavioral:  Negative for sleep  disturbance.           Objective:   There were no vitals taken for this visit.    Physical Exam    LOWER EXTREMITY PHYSICAL EXAMINATION  DERMATOLOGY:  Skin is supple, dry and intact. No ulcerations are noted. No hyperkeratosis or calluses are noted. No ecchymosis appreciated.  There are nail changes which are consistent with onychomycosis on the left and right foot. On the left foot, nail #2 is/are thickened, is/are dystrophic, with yellow discoloration, and with malodorous subungual debris. On the right foot, nail #2 is/are thickened, is/are dystrophic, with yellow discoloration, and with malodorous subungual debris.     Assessment:     1. Onychomycosis due to dermatophyte      Plan:     Onychomycosis due to dermatophyte  -     terbinafine HCL (LAMISIL) 250 mg tablet; Take 1 tablet (250 mg total) by mouth once daily.  Dispense: 90 tablet; Refill: 0  -     COMPREHENSIVE METABOLIC PANEL; Future; Expected date: 01/28/2025      Thorough discussion is had with the patient today, concerning the diagnosis, its etiology, and the treatment algorithm at present.     Most recent labs reviewed in detail with the patient. All questions and concerns regarding findings and its/their implications are outlined and discussed.    Discussed the various treatment options concerning onychomycosis, these being over-the-counter topicals (efficacy is low in regards to cure), prescription strength topicals (better efficacy as compared to OTC versions, but only slightly so, and potentially costly), laser therapy (which is not covered by insurance companies), oral medications (patient is advised that there is a potential, though less than 5%, for the potential of adverse health and side effects; namely liver pathology) and doing nothing (frequent debridements) as onychomycosis is a cosmetic ailment, and has no potential for long-term deleterious effects on the health. Did discuss the sides effects of PO therapy and what to monitor for,  namely, headache, diarrhea, itching and rash, indigestion, liver enzyme abnormalities, taste disturbance, nausea, abdominal pain, flatulence (gas), vomiting and upper respiratory tract infection. Rx. for 90 days course is provided.    LFTs in 45 days.        Future Appointments   Date Time Provider Department Center   1/28/2025 11:00 AM LABORATORY, [facility]   2/4/2025  9:00 AM ORTHO BOOT [place] [facility]   2/4/2025  2:00 PM BRENDON-OPERATIVE DEENA, [facility]UC[facility] ON[facility] PREOPC [facility]   2/27/2025 10:00 AM ON [facility] ON [facility]   2/27/2025 10:30 AM Serena Thomas PA ON ORTHO [facility]   4/10/2025 10:30 AM Serena Thomas PA ON[facility] ORTHO [facility]   5/12/2025 10:30 AM ON [facility] XRAY [facility]   5/12/2025 11:00 AM Arden Sullivan MD ON[facility] ORTHO [facility]   1/16/2026 11:20 AM Onofre Powell MD ON CARDIO [facility]

## 2025-01-29 DIAGNOSIS — Z01.818 PREOP TESTING: Primary | ICD-10-CM

## 2025-02-04 ENCOUNTER — OFFICE VISIT (OUTPATIENT)
Dept: INTERNAL MEDICINE | Facility: CLINIC | Age: 60
End: 2025-02-04
Payer: COMMERCIAL

## 2025-02-04 ENCOUNTER — HOSPITAL ENCOUNTER (OUTPATIENT)
Dept: RADIOLOGY | Facility: HOSPITAL | Age: 60
Discharge: HOME | End: 2025-02-04
Attending: NURSE PRACTITIONER
Payer: COMMERCIAL

## 2025-02-04 VITALS
DIASTOLIC BLOOD PRESSURE: 82 MMHG | HEART RATE: 64 BPM | TEMPERATURE: 98 F | OXYGEN SATURATION: 97 % | SYSTOLIC BLOOD PRESSURE: 130 MMHG

## 2025-02-04 DIAGNOSIS — Z01.818 PREOPERATIVE EXAMINATION: ICD-10-CM

## 2025-02-04 DIAGNOSIS — M21.161 ACQUIRED VARUS DEFORMITY KNEE, RIGHT: ICD-10-CM

## 2025-02-04 DIAGNOSIS — E55.9 VITAMIN D DEFICIENCY: ICD-10-CM

## 2025-02-04 DIAGNOSIS — S83.241A ACUTE MEDIAL MENISCUS TEAR OF RIGHT KNEE, INITIAL ENCOUNTER: ICD-10-CM

## 2025-02-04 DIAGNOSIS — B35.1 ONYCHOMYCOSIS: ICD-10-CM

## 2025-02-04 DIAGNOSIS — M17.11 ARTHRITIS OF KNEE, RIGHT: Primary | ICD-10-CM

## 2025-02-04 PROCEDURE — 99999 PR PBB SHADOW E&M-EST. PATIENT-LVL III: CPT | Mod: PBBFAC,,,

## 2025-02-04 PROCEDURE — 71046 X-RAY EXAM CHEST 2 VIEWS: CPT | Mod: TC

## 2025-02-04 PROCEDURE — 71046 X-RAY EXAM CHEST 2 VIEWS: CPT | Mod: 26,,, | Performed by: RADIOLOGY

## 2025-02-04 NOTE — PRE ADMISSION SCREENING
Pre op instructions reviewed with patient face to face during Clinic Visit with Provider, verbalized understanding.    To confirm, Surgery is scheduled on 2/12/25. We will call you after 2pm the day before Surgery with your arrival time.    *Please report to the [facility] Lobby (1st Floor) located off of [facility] (2nd Entrance/Building on the left, in front of the flag pole).  Address: [address] [place] [address] [place]. [id number]    Your Type & Screen appointment is scheduled on 2/11/25 @ [facility] ([place] [facility]). Please DO NOT remove the red arm band applied.      !!!INSTRUCTIONS IMPORTANT!!!  !!!NO FOOD after midnight! You may have clear liquids up to 3 hrs before your arrival to the Hospital!!!  Clear liquids include Gatorade, water, soda, black coffee or tea (no milk or creamer), and clear juices.  Clear liquids do NOT include anything with pulp or food particles (Chicken broth, ice cream, yogurt, Jello, etc.)  NOTHING RED OR PURPLE!!!!      MORNING OF SURGERY, drink small sip of water with the following medications instructed by Pre-Admit Provider:  NONE    *Additional Surgeon Instructions: Take Tylenol 650 mg and drink a bottle of Gatorade the night prior to surgery! BRING WALKER to Hospital if received prior to surgery!    If your are taking Ozempic/ Mounjaro/ Wegovy/ Trulicity/ Semaglutide injections or weight loss medication, such of Phentermine, please notify us immediately as they must be stopped 7 days prior to surgery.    !!!Patients should HOLD all vitamins, herbal supplements,aspirins, NSAIDS & weight loss medications 7 days prior to surgery!!! Ok to take Tylenol:)    ____  Avoid Alcoholic beverages 3 days prior to surgery, as it can thin the blood.  ____  NO Acrylic/fake nails or nail polish worn day of surgery (specifically hand/arm & foot surgeries).  ____  NO powder, lotions, deodorants, oils or cream on body.  ____  Remove all jewelry, piercings, & foreign objects prior to  arrival and leave at home.  ____  Remove Dentures, Hearing Aids & Contact Lens prior to surgery.  ____  Bring photo ID and insurance information to hospital (Leave Valuables at Home).  ____  If going home the same day, arrange for a ride home. You will not be able to drive for 24 hrs if Anesthesia was used.   ____  Females (ages 11-60): may need to give a urine sample the morning of surgery; please see Pre op Nurse prior to using the restroom.  ____  Wear clean, loose fitting clothing to allow for dressings/ bandages.      Bathing Instructions:       -Do not shave your body 3 days before the day of surgery.              -Shower with Dial / Hibiclens soap/antibacterial for 3 days prior to surgery to decrease risk of infection             -Shower & Rinse your body as usual with anti-bacterial Soap, (Dial), for three days before surgery              - on the evening prior to surgery and the morning of surgery, apply one packet of Hibiclens soap to the surgical site.               -Wash the site gently for 5 full minutes. Do Not scrub your skin too hard.               -Rinse your body thoroughly.                -Pat yourself day with a clean, soft towel.               -Do not use lotion, cream, powder or deodorant               -Dress in clean clothes      [facility] Visitor/Ride Policy:  Only 2 adults allowed in pre op/recovery area during your procedure. You MUST HAVE A RIDE HOME from a responsible adult that you know and trust. Medical Transport, Uber or Lyft can ONLY be used if patient has a responsible adult to accompany them during ride home.        *Signs and symptoms of Infection Before or After Surgery:               !!!If you experience any fever, chills, nausea/ vomiting, foul odor/ excessive drainage from surgical site, flu-like symptoms, new wounds or cuts, PLEASE CALL THE SURGEON OFFICE at [phone] or SEND MESSAGE THROUGH [employer]!!!       *If you are running late day of surgery, please call the  Surgery Dept @ [phone].    *Billing question, please call:  [phone] or [phone]       Thank you,  -[facility] Pre Admit Dept.  [phone] - [place]   or [phone]  M-F 7:30 am-4:00 pm (Closed Major Holidays)    Additional Tests Scheduled Today:  LABS / URINE TEST (1ST Floor) Check in at the !

## 2025-02-04 NOTE — ASSESSMENT & PLAN NOTE
ARTHROPLASTY, KNEE, TOTAL (RIGHT) planned per Dr. Sullivan on 2/12/2025     Known risk factors for perioperative complications: None    Difficulty with intubation is not anticipated.    Cardiac Risk Estimation: Based on the Revised Cardiac Risk index, patient is a Class I risk with a 3.9 % risk of a major cardiac event in a low risk procedure.    1.) Preoperative workup as follows: chest x-ray, ECG, hemoglobin, hematocrit, electrolytes, creatinine, glucose, urinalysis (urinary tract instrumentation planned).  2.) Change in medication regimen before surgery: discontinue ASA 6 days before surgery, discontinue NSAIDs (Meloxicam/Diclofenac) 5 days before surgery, hold Metformin 24 hours prior to surgery. Hold all vitamins/supplements for 7 days prior to surgery, with the exception of Potassium and Iron supplementation. Hold semaglutide/tirzepatide for 7 days prior to surgery. Please refrain from use of alcohol for 72 hours prior to procedure   3.) Prophylaxis for cardiac events with perioperative beta-blockers: not indicated.  4.) Invasive hemodynamic monitoring perioperatively: at the discretion of anesthesiologist.  5.) Deep vein thrombosis prophylaxis postoperatively: regimen to be chosen by surgical team.  6.) Surveillance for postoperative MI with ECG immediately postoperatively and on postoperati ve days 1 and 2 AND troponin levels 24 hours postoperatively and on day 4 or hospital discharge (whichever comes first): at the discretion of anesthesiologist.  7.) Current medications which may produce withdrawal symptoms if withheld perioperatively: None  8.) Other measures: Postoperative incentive spirometry to prevent pneumonia.   -Urinalysis results reviewed with no evidence of infectious process noted

## 2025-02-04 NOTE — ASSESSMENT & PLAN NOTE
-Vitamin D supplement prescribed   -patient advised to hold all vitamins and supplements for 7 days prior to procedure

## 2025-02-04 NOTE — PROGRESS NOTES
Preoperative History and Physical  Pre-Admit Testing                                                                    Chief Complaint: Preoperative evaluation     History of Present Illness:      Simi Constantino is a 59 y.o. female who presents to the office today for a preoperative consultation at the request of Dr. Sullivan who plans on performing a ARTHROPLASTY, KNEE, TOTAL (RIGHT) on February 12.     Functional Status:      The patient is able to climb a flight of stairs slowly. The patient is able to ambulate independently without difficulty. The patient's functional status is affected by the surgical problem due to pain. The patient's functional status is not affected by shortness of breath, chest pain, dyspnea on exertion and fatigue.    MET score greater than 4    Patient Anesthesia History:      History of Malignant Hyperthermia: no  History of Pseudocholinesterase Deficiency: no  History of PONV: no  History of difficult intubation: no  History of delayed emergence: yes  History of high fever after anesthesia: no    Family Anesthesia History:      History of Malignant Hyperthermia: no  History of Pseudocholinesterase Deficiency: no    Past Medical History:      Past Medical History:   Diagnosis Date    Vitamin D deficiency         Past Surgical History:      Past Surgical History:   Procedure Laterality Date    COLONOSCOPY      x3    TONSILLECTOMY  1970    TOTAL VAGINAL HYSTERECTOMY  2016        Social History:      Social History     Socioeconomic History    Marital status:    Tobacco Use    Smoking status: Never    Smokeless tobacco: Never   Substance and Sexual Activity    Alcohol use: Yes     Alcohol/week: 1.0 standard drink of alcohol     Types: 1 Glasses of wine per week        Family History:      Family History   Problem Relation Name Age of Onset    Cancer Mother          Liver cancer    Hyperlipidemia Mother      Cancer Father           prostate cancer    Charcot-Catarina-Tooth disease Father      No Known Problems Sister      No Known Problems Brother      No Known Problems Maternal Grandmother      No Known Problems Maternal Grandfather      No Known Problems Paternal Grandmother      No Known Problems Paternal Grandfather         Allergies:      Review of patient's allergies indicates:  No Known Allergies    Medications:      Current Outpatient Medications   Medication Sig    diclofenac (VOLTAREN) 75 MG EC tablet Take 1 tablet (75 mg total) by mouth 2 (two) times daily as needed (Pain). Take with food    meloxicam (MOBIC) 15 MG tablet Take 1 tablet (15 mg total) by mouth once daily. Take with food    terbinafine HCL (LAMISIL) 250 mg tablet Take 1 tablet (250 mg total) by mouth once daily.    vitamin D (VITAMIN D3) 1000 units Tab Take 185 mg by mouth.     No current facility-administered medications for this visit.       Vitals:      Vitals:    02/04/25 1505   BP: 130/82   Pulse: 64   Temp: 98.1 °F (36.7 °C)       Review of Systems:        Constitutional: Negative for fever, chills, weight loss, malaise/fatigue and diaphoresis.   HENT: Negative for hearing loss, ear pain, nosebleeds, congestion, sore throat, neck pain, tinnitus and ear discharge.    Eyes: Negative for blurred vision, double vision, photophobia, pain, discharge and redness.   Respiratory: Negative for cough, hemoptysis, sputum production, shortness of breath, wheezing and stridor.    Cardiovascular: Negative for chest pain, palpitations, orthopnea, claudication,  and PND. + RLE edema and + pedal edema   Gastrointestinal: Negative for heartburn, nausea, vomiting, abdominal pain, diarrhea, constipation, blood in stool and melena.   Genitourinary: Negative for dysuria, urgency, frequency, hematuria and flank pain.   Musculoskeletal: Negative for myalgias, back pain, and falls. + R knee pain   Skin: Negative for itching and rash.   Neurological: Negative for dizziness, tingling, tremors,  sensory change, speech change, focal weakness, seizures, loss of consciousness, weakness and headaches.   Endo/Heme/Allergies: Negative for environmental allergies and polydipsia. Does not bruise/bleed easily.   Psychiatric/Behavioral: Negative for depression, suicidal ideas, hallucinations, memory loss and substance abuse. The patient is not nervous/anxious and does not have insomnia.    All 14 systems reviewed and negative except as noted above.    Physical Exam:      Constitutional: Appears well-developed, well-nourished and in no acute distress.  Patient is oriented to person, place, and time.   Head: Normocephalic and atraumatic. Mucous membranes moist.  Neck: Neck supple no mass.   Cardiovascular: Normal rate and regular rhythm.  S1 S2 appreciated by ascultation.  Pulmonary/Chest: Effort normal and clear to auscultation bilaterally. Decreased bibasilar. No respiratory distress.   Abdomen: Soft. Non-tender and non-distended. Bowel sounds are normal.   Neurological: Patient is alert and oriented to person, place and time. Moves all extremities.  Skin: Warm and dry. No lesions.  Extremities: No clubbing, cyanosis or edema. Limited ROM to R knee due to pain     Laboratory data:      Reviewed and noted in plan where applicable. Please see chart for full laboratory data.    Lab Results   Component Value Date    WBC 5.83 02/04/2025    HGB 13.7 02/04/2025    HCT 40.7 02/04/2025    MCV 91 02/04/2025     02/04/2025     COMPREHENSIVE METABOLIC PANEL  Order: [id number]  Status: Final result          Component Ref Range & Units 1 d ago 19 yr ago   Sodium 136 - 145 mmol/L 143 140 R   Potassium 3.5 - 5.1 mmol/L 4.2 3.8 R   Chloride 95 - 110 mmol/L 107 102 R   CO2 23 - 29 mmol/L 29 23 R   Glucose 70 - 110 mg/dL 105 87 R   BUN 6 - 20 mg/dL 13 17 R   Creatinine 0.5 - 1.4 mg/dL 0.8 0.8 R   Calcium 8.7 - 10.5 mg/dL 9.5 9.5 R   Total Protein 6.0 - 8.4 g/dL 6.5    Albumin 3.5 - 5.2 g/dL 4.0    Total Bilirubin 0.1 - 1.0 mg/dL  0.9    Comment: For infants and newborns, interpretation of results should be based  on gestational age, weight and in agreement with clinical  observations.    Premature Infant recommended reference ranges:  Up to 24 hours.............<8.0 mg/dL  Up to 48 hours............<12.0 mg/dL  3-5 days..................<15.0 mg/dL  6-29 days.................<15.0 mg/dL   Alkaline Phosphatase 40 - 150 U/L 51    AST 10 - 40 U/L 18    ALT 10 - 44 U/L 15    eGFR >60 mL/min/1.73 m^2 >60    Anion Gap 8 - 16 mmol/L 7 Low     Resulting Agency  [facility]              Narrative  Performed by: FELI  Send normal result to authorizing provider's In Basket if  patient is active on MyChart:->Yes   Specimen Collected: 02/04/25 14:52 CST Last Resulted: 02/04/25 16:13 CST         Predictors of intubation difficulty:       Morbid obesity? BMI 39.91   Anatomically abnormal facies? no   Prominent incisors? no   Receding mandible? no   Short, thick neck? no   Neck range of motion: normal   Dentition: No chipped, loose, or missing teeth.  Based on the Modified Mallampati, patient is a mallampati score: III (soft and hard palate and base of uvula visible)    Cardiographics:      ECG: normal sinus rhythm  Echocardiogram: not indicated    Imaging:      Chest x-ray: Cardiac silhouette and mediastinal contours are normal. Lungs are clear. Osseous structures are intact. No acute cardiopulmonary process per imaging on 2/4/2025    Assessment and Plan:      1. Arthritis of knee, right  Assessment & Plan:  ARTHROPLASTY, KNEE, TOTAL (RIGHT) planned per Dr. Sullivan on 2/12/2025     Known risk factors for perioperative complications: None    Difficulty with intubation is not anticipated.    Cardiac Risk Estimation: Based on the Revised Cardiac Risk index, patient is a Class I risk with a 3.9 % risk of a major cardiac event in a low risk procedure.    1.) Preoperative workup as follows: chest x-ray, ECG, hemoglobin, hematocrit, electrolytes, creatinine,  glucose, urinalysis (urinary tract instrumentation planned).  2.) Change in medication regimen before surgery: discontinue ASA 6 days before surgery, discontinue NSAIDs (Meloxicam/Diclofenac) 5 days before surgery, hold Metformin 24 hours prior to surgery. Hold all vitamins/supplements for 7 days prior to surgery, with the exception of Potassium and Iron supplementation. Hold semaglutide/tirzepatide for 7 days prior to surgery. Please refrain from use of alcohol for 72 hours prior to procedure   3.) Prophylaxis for cardiac events with perioperative beta-blockers: not indicated.  4.) Invasive hemodynamic monitoring perioperatively: at the discretion of anesthesiologist.  5.) Deep vein thrombosis prophylaxis postoperatively: regimen to be chosen by surgical team.  6.) Surveillance for postoperative MI with ECG immediately postoperatively and on postoperati ve days 1 and 2 AND troponin levels 24 hours postoperatively and on day 4 or hospital discharge (whichever comes first): at the discretion of anesthesiologist.  7.) Current medications which may produce withdrawal symptoms if withheld perioperatively: None  8.) Other measures: Postoperative incentive spirometry to prevent pneumonia.   -Urinalysis results reviewed with no evidence of infectious process noted      2. Preoperative examination  -     Ambulatory referral/consult to Pre-Admit  -     X-Ray Chest PA And Lateral; Future; Expected date: 02/04/2025  -     CBC Auto Differential; Future; Expected date: 02/04/2025    3. Vitamin D deficiency  Assessment & Plan:  -Vitamin D supplement prescribed   -patient advised to hold all vitamins and supplements for 7 days prior to procedure       4. BMI 39.0-39.9,adult  Assessment & Plan:  -BMI 39.91       5. Onychomycosis  Assessment & Plan:  -Terbinafine prescribed- patient to initiate medication post procedure                Electronically signed by Alejandra Wang NP on 2/5/2025 at 2:23 PM.

## 2025-02-10 ENCOUNTER — LAB VISIT (OUTPATIENT)
Dept: LAB | Facility: HOSPITAL | Age: 60
End: 2025-02-10
Attending: ORTHOPAEDIC SURGERY
Payer: COMMERCIAL

## 2025-02-10 DIAGNOSIS — Z01.818 PREOP TESTING: ICD-10-CM

## 2025-02-10 LAB
ABO + RH BLD: NORMAL
BLD GP AB SCN CELLS X3 SERPL QL: NORMAL
SPECIMEN OUTDATE: NORMAL

## 2025-02-10 PROCEDURE — 86850 RBC ANTIBODY SCREEN: CPT | Performed by: ORTHOPAEDIC SURGERY

## 2025-02-10 PROCEDURE — 36415 COLL VENOUS BLD VENIPUNCTURE: CPT | Performed by: ORTHOPAEDIC SURGERY

## 2025-02-11 ENCOUNTER — TELEPHONE (OUTPATIENT)
Dept: PREADMISSION TESTING | Facility: HOSPITAL | Age: 60
End: 2025-02-11
Payer: COMMERCIAL

## 2025-02-11 NOTE — TELEPHONE ENCOUNTER
Called and spoke with patient about the following:     Please arrive to [facility] at 7:00AM on 2/12/25 for your scheduled procedure.  Address: [address] [place] [address] [place] [address] ([address]nd Building on left, 1st Floor Lobby)    !!!NO FOOD after midnight! You may have clear liquids up to 3 hrs before your arrival to the Hospital!!!  Clear liquids include Gatorade, water, soda, black coffee or tea (no milk or creamer), and clear juices.  Clear liquids do NOT include anything with pulp or food particles (Chicken broth, ice cream, yogurt, Jello, etc.)    Thank you,  -[facility] Pre Admit Dept.  Mon-Fri 7:30 am - 4 pm [phone]

## 2025-02-12 ENCOUNTER — HOSPITAL ENCOUNTER (OUTPATIENT)
Facility: HOSPITAL | Age: 60
Discharge: HOME | End: 2025-02-12
Attending: ORTHOPAEDIC SURGERY | Admitting: ORTHOPAEDIC SURGERY
Payer: COMMERCIAL

## 2025-02-12 ENCOUNTER — ANESTHESIA EVENT (OUTPATIENT)
Dept: SURGERY | Facility: HOSPITAL | Age: 60
End: 2025-02-12
Payer: COMMERCIAL

## 2025-02-12 ENCOUNTER — ANESTHESIA (OUTPATIENT)
Dept: SURGERY | Facility: HOSPITAL | Age: 60
End: 2025-02-12
Payer: COMMERCIAL

## 2025-02-12 VITALS
SYSTOLIC BLOOD PRESSURE: 123 MMHG | BODY MASS INDEX: 40.21 KG/M2 | HEART RATE: 72 BPM | HEIGHT: 63 IN | TEMPERATURE: 97 F | DIASTOLIC BLOOD PRESSURE: 64 MMHG | WEIGHT: 226.94 LBS | OXYGEN SATURATION: 97 % | RESPIRATION RATE: 22 BRPM

## 2025-02-12 DIAGNOSIS — M17.11 ARTHRITIS OF RIGHT KNEE: ICD-10-CM

## 2025-02-12 DIAGNOSIS — Z01.818 PREOP TESTING: ICD-10-CM

## 2025-02-12 DIAGNOSIS — M17.11 ARTHRITIS OF KNEE, RIGHT: Primary | ICD-10-CM

## 2025-02-12 LAB
HCT VFR BLD AUTO: 39.7 % (ref 37–48.5)
HGB BLD-MCNC: 13.1 G/DL (ref 12–16)

## 2025-02-12 PROCEDURE — 71000033 HC RECOVERY, INTIAL HOUR: Performed by: ORTHOPAEDIC SURGERY

## 2025-02-12 PROCEDURE — 85014 HEMATOCRIT: CPT | Performed by: ORTHOPAEDIC SURGERY

## 2025-02-12 PROCEDURE — 27447 TOTAL KNEE ARTHROPLASTY: CPT | Mod: RT,,, | Performed by: ORTHOPAEDIC SURGERY

## 2025-02-12 PROCEDURE — C1713 ANCHOR/SCREW BN/BN,TIS/BN: HCPCS | Performed by: ORTHOPAEDIC SURGERY

## 2025-02-12 PROCEDURE — 63600175 PHARM REV CODE 636 W HCPCS: Performed by: NURSE ANESTHETIST, CERTIFIED REGISTERED

## 2025-02-12 PROCEDURE — 36000710: Performed by: ORTHOPAEDIC SURGERY

## 2025-02-12 PROCEDURE — 63600175 PHARM REV CODE 636 W HCPCS: Performed by: ORTHOPAEDIC SURGERY

## 2025-02-12 PROCEDURE — 85018 HEMOGLOBIN: CPT | Performed by: ORTHOPAEDIC SURGERY

## 2025-02-12 PROCEDURE — 36000711: Performed by: ORTHOPAEDIC SURGERY

## 2025-02-12 PROCEDURE — 25000003 PHARM REV CODE 250: Performed by: NURSE ANESTHETIST, CERTIFIED REGISTERED

## 2025-02-12 PROCEDURE — 25000003 PHARM REV CODE 250: Performed by: ANESTHESIOLOGY

## 2025-02-12 PROCEDURE — 37000008 HC ANESTHESIA 1ST 15 MINUTES: Performed by: ORTHOPAEDIC SURGERY

## 2025-02-12 PROCEDURE — C1776 JOINT DEVICE (IMPLANTABLE): HCPCS | Performed by: ORTHOPAEDIC SURGERY

## 2025-02-12 PROCEDURE — 27447 TOTAL KNEE ARTHROPLASTY: CPT | Mod: AS,RT,, | Performed by: PHYSICIAN ASSISTANT

## 2025-02-12 PROCEDURE — 25000003 PHARM REV CODE 250: Performed by: ORTHOPAEDIC SURGERY

## 2025-02-12 PROCEDURE — 37000009 HC ANESTHESIA EA ADD 15 MINS: Performed by: ORTHOPAEDIC SURGERY

## 2025-02-12 PROCEDURE — 71000015 HC POSTOP RECOV 1ST HR: Performed by: ORTHOPAEDIC SURGERY

## 2025-02-12 PROCEDURE — 97530 THERAPEUTIC ACTIVITIES: CPT

## 2025-02-12 PROCEDURE — 63600175 PHARM REV CODE 636 W HCPCS: Mod: JZ,TB | Performed by: ANESTHESIOLOGY

## 2025-02-12 PROCEDURE — 97161 PT EVAL LOW COMPLEX 20 MIN: CPT

## 2025-02-12 PROCEDURE — 71000039 HC RECOVERY, EACH ADD'L HOUR: Performed by: ORTHOPAEDIC SURGERY

## 2025-02-12 PROCEDURE — 27201423 OPTIME MED/SURG SUP & DEVICES STERILE SUPPLY: Performed by: ORTHOPAEDIC SURGERY

## 2025-02-12 DEVICE — ATTUNE KNEE SYSTEM REVISION FIXED BEARING TIBIAL BASE CEMENTED SIZE 4
Type: IMPLANTABLE DEVICE | Site: KNEE | Status: FUNCTIONAL
Brand: ATTUNE

## 2025-02-12 DEVICE — SIMPLEX® HV WITH GENTAMICIN IS A FAST-SETTING ACRYLIC RESIN WITH ADDITION OF GENTAMICIN SULFATE FOR USE IN BONE SURGERY. MIXING THE TWO SEPARATE STERILE COMPONENTS PRODUCES A DUCTILE BONE CEMENT WHICH, AFTER HARDENING, FIXES THE IMPLANT AND TRANSFERS STRESSES PRODUCED DURING MOVEMENT EVENLY TO THE BONE. SIMPLEX® HV WITH GENTAMICINN CEMENT POWDER ALSO CONTAINS INSOLUBLE ZIRCONIUM DIOXIDE AS AN X-RAY CONTRAST MEDIUM. SIMPLEX® HV WITH GENTAMICIN DOES NOT EMIT A SIGNAL AND DOES NOT POSE A SAFETY RISK IN A MAGNETIC RESONANCE ENVIRONMENT.
Type: IMPLANTABLE DEVICE | Site: KNEE | Status: FUNCTIONAL
Brand: SIMPLEX HV WITH GENTAMICIN

## 2025-02-12 DEVICE — ATTUNE KNEE SYSTEM TIBIAL INSERT FIXED BEARING POSTERIOR STABILIZED 5 8MM AOX
Type: IMPLANTABLE DEVICE | Site: KNEE | Status: FUNCTIONAL
Brand: ATTUNE

## 2025-02-12 DEVICE — ATTUNE PATELLA MEDIALIZED DOME 32MM CEMENTED AOX
Type: IMPLANTABLE DEVICE | Site: KNEE | Status: FUNCTIONAL
Brand: ATTUNE

## 2025-02-12 DEVICE — ATTUNE PINNING SYSTEM
Type: IMPLANTABLE DEVICE | Site: KNEE | Status: FUNCTIONAL
Brand: ATTUNE

## 2025-02-12 DEVICE — ATTUNE KNEE SYSTEM REVISION CEMENTED STEM CEMENTED 14X50MM
Type: IMPLANTABLE DEVICE | Site: KNEE | Status: FUNCTIONAL
Brand: ATTUNE

## 2025-02-12 RX ORDER — ONDANSETRON HYDROCHLORIDE 2 MG/ML
4 INJECTION, SOLUTION INTRAVENOUS EVERY 12 HOURS PRN
Status: DISCONTINUED | OUTPATIENT
Start: 2025-02-12 | End: 2025-02-12 | Stop reason: HOSPADM

## 2025-02-12 RX ORDER — OXYCODONE HYDROCHLORIDE 5 MG/1
10 TABLET ORAL
Status: DISCONTINUED | OUTPATIENT
Start: 2025-02-12 | End: 2025-02-12 | Stop reason: HOSPADM

## 2025-02-12 RX ORDER — TRANEXAMIC ACID 10 MG/ML
1000 INJECTION, SOLUTION INTRAVENOUS
Status: COMPLETED | OUTPATIENT
Start: 2025-02-12 | End: 2025-02-12

## 2025-02-12 RX ORDER — HYDROMORPHONE HYDROCHLORIDE 1 MG/ML
0.2 INJECTION, SOLUTION INTRAMUSCULAR; INTRAVENOUS; SUBCUTANEOUS EVERY 5 MIN PRN
Status: DISCONTINUED | OUTPATIENT
Start: 2025-02-12 | End: 2025-02-12 | Stop reason: HOSPADM

## 2025-02-12 RX ORDER — ACETAMINOPHEN 500 MG
500 TABLET ORAL EVERY 6 HOURS PRN
Status: ON HOLD | COMMUNITY
End: 2025-02-12 | Stop reason: HOSPADM

## 2025-02-12 RX ORDER — MIDAZOLAM HYDROCHLORIDE 1 MG/ML
INJECTION INTRAMUSCULAR; INTRAVENOUS
Status: DISCONTINUED | OUTPATIENT
Start: 2025-02-12 | End: 2025-02-12

## 2025-02-12 RX ORDER — GABAPENTIN 300 MG/1
300 CAPSULE ORAL NIGHTLY
Qty: 30 CAPSULE | Refills: 11 | Status: SHIPPED | OUTPATIENT
Start: 2025-02-12 | End: 2026-02-12

## 2025-02-12 RX ORDER — SUCCINYLCHOLINE CHLORIDE 20 MG/ML
INJECTION INTRAMUSCULAR; INTRAVENOUS
Status: DISCONTINUED | OUTPATIENT
Start: 2025-02-12 | End: 2025-02-12

## 2025-02-12 RX ORDER — LIDOCAINE HYDROCHLORIDE 20 MG/ML
INJECTION INTRAVENOUS
Status: DISCONTINUED | OUTPATIENT
Start: 2025-02-12 | End: 2025-02-12

## 2025-02-12 RX ORDER — ONDANSETRON HYDROCHLORIDE 2 MG/ML
4 INJECTION, SOLUTION INTRAVENOUS ONCE AS NEEDED
Status: DISCONTINUED | OUTPATIENT
Start: 2025-02-12 | End: 2025-02-12 | Stop reason: HOSPADM

## 2025-02-12 RX ORDER — OXYCODONE AND ACETAMINOPHEN 10; 325 MG/1; MG/1
1 TABLET ORAL EVERY 6 HOURS PRN
Qty: 30 TABLET | Refills: 0 | Status: SHIPPED | OUTPATIENT
Start: 2025-02-12

## 2025-02-12 RX ORDER — ONDANSETRON HYDROCHLORIDE 2 MG/ML
INJECTION, SOLUTION INTRAVENOUS
Status: DISCONTINUED | OUTPATIENT
Start: 2025-02-12 | End: 2025-02-12

## 2025-02-12 RX ORDER — DEXAMETHASONE SODIUM PHOSPHATE 4 MG/ML
8 INJECTION, SOLUTION INTRA-ARTICULAR; INTRALESIONAL; INTRAMUSCULAR; INTRAVENOUS; SOFT TISSUE
Status: DISCONTINUED | OUTPATIENT
Start: 2025-02-12 | End: 2025-02-12 | Stop reason: HOSPADM

## 2025-02-12 RX ORDER — ROPIVACAINE/EPI/CLONIDINE/KET 2.46-0.005
SYRINGE (ML) INJECTION
Status: DISCONTINUED | OUTPATIENT
Start: 2025-02-12 | End: 2025-02-12 | Stop reason: HOSPADM

## 2025-02-12 RX ORDER — GABAPENTIN 300 MG/1
300 CAPSULE ORAL DAILY
Status: DISCONTINUED | OUTPATIENT
Start: 2025-02-12 | End: 2025-02-12 | Stop reason: HOSPADM

## 2025-02-12 RX ORDER — CHLORHEXIDINE GLUCONATE ORAL RINSE 1.2 MG/ML
10 SOLUTION DENTAL 2 TIMES DAILY
Status: DISCONTINUED | OUTPATIENT
Start: 2025-02-12 | End: 2025-02-12 | Stop reason: HOSPADM

## 2025-02-12 RX ORDER — CEFAZOLIN SODIUM 1 G/3ML
2 INJECTION, POWDER, FOR SOLUTION INTRAMUSCULAR; INTRAVENOUS
Status: DISCONTINUED | OUTPATIENT
Start: 2025-02-12 | End: 2025-02-12 | Stop reason: HOSPADM

## 2025-02-12 RX ORDER — FENTANYL CITRATE 50 UG/ML
INJECTION, SOLUTION INTRAMUSCULAR; INTRAVENOUS
Status: DISCONTINUED | OUTPATIENT
Start: 2025-02-12 | End: 2025-02-12

## 2025-02-12 RX ORDER — ONDANSETRON HYDROCHLORIDE 2 MG/ML
4 INJECTION, SOLUTION INTRAVENOUS DAILY PRN
Status: DISCONTINUED | OUTPATIENT
Start: 2025-02-12 | End: 2025-02-12 | Stop reason: HOSPADM

## 2025-02-12 RX ORDER — MORPHINE SULFATE 4 MG/ML
2 INJECTION, SOLUTION INTRAMUSCULAR; INTRAVENOUS EVERY 4 HOURS PRN
Status: DISCONTINUED | OUTPATIENT
Start: 2025-02-12 | End: 2025-02-12 | Stop reason: HOSPADM

## 2025-02-12 RX ORDER — OXYCODONE AND ACETAMINOPHEN 5; 325 MG/1; MG/1
1 TABLET ORAL
Status: DISCONTINUED | OUTPATIENT
Start: 2025-02-12 | End: 2025-02-12 | Stop reason: HOSPADM

## 2025-02-12 RX ORDER — DEXTROMETHORPHAN HYDROBROMIDE, GUAIFENESIN 5; 100 MG/5ML; MG/5ML
650 LIQUID ORAL EVERY 8 HOURS
COMMUNITY

## 2025-02-12 RX ORDER — SODIUM CHLORIDE 9 MG/ML
INJECTION, SOLUTION INTRAVENOUS CONTINUOUS
Status: DISCONTINUED | OUTPATIENT
Start: 2025-02-12 | End: 2025-02-12 | Stop reason: HOSPADM

## 2025-02-12 RX ORDER — FENTANYL CITRATE 50 UG/ML
25 INJECTION, SOLUTION INTRAMUSCULAR; INTRAVENOUS EVERY 5 MIN PRN
Status: DISCONTINUED | OUTPATIENT
Start: 2025-02-12 | End: 2025-02-12 | Stop reason: HOSPADM

## 2025-02-12 RX ORDER — ONDANSETRON 4 MG/1
4 TABLET, FILM COATED ORAL EVERY 6 HOURS PRN
Qty: 21 TABLET | Refills: 0 | Status: SHIPPED | OUTPATIENT
Start: 2025-02-12

## 2025-02-12 RX ORDER — PROPOFOL 10 MG/ML
VIAL (ML) INTRAVENOUS
Status: DISCONTINUED | OUTPATIENT
Start: 2025-02-12 | End: 2025-02-12

## 2025-02-12 RX ORDER — CHLORHEXIDINE GLUCONATE ORAL RINSE 1.2 MG/ML
10 SOLUTION DENTAL
Status: DISCONTINUED | OUTPATIENT
Start: 2025-02-12 | End: 2025-02-12 | Stop reason: HOSPADM

## 2025-02-12 RX ORDER — DOCUSATE SODIUM 100 MG/1
100 CAPSULE, LIQUID FILLED ORAL 2 TIMES DAILY
Status: DISCONTINUED | OUTPATIENT
Start: 2025-02-12 | End: 2025-02-12 | Stop reason: HOSPADM

## 2025-02-12 RX ORDER — OXYCODONE HYDROCHLORIDE 5 MG/1
5 TABLET ORAL
Status: DISCONTINUED | OUTPATIENT
Start: 2025-02-12 | End: 2025-02-12 | Stop reason: HOSPADM

## 2025-02-12 RX ORDER — BISACODYL 10 MG/1
10 SUPPOSITORY RECTAL DAILY PRN
Status: DISCONTINUED | OUTPATIENT
Start: 2025-02-12 | End: 2025-02-12 | Stop reason: HOSPADM

## 2025-02-12 RX ORDER — ONDANSETRON 8 MG/1
8 TABLET, ORALLY DISINTEGRATING ORAL EVERY 8 HOURS PRN
Status: DISCONTINUED | OUTPATIENT
Start: 2025-02-12 | End: 2025-02-12 | Stop reason: HOSPADM

## 2025-02-12 RX ORDER — ACETAMINOPHEN 325 MG/1
650 TABLET ORAL EVERY 8 HOURS PRN
Status: DISCONTINUED | OUTPATIENT
Start: 2025-02-12 | End: 2025-02-12 | Stop reason: HOSPADM

## 2025-02-12 RX ORDER — CEFAZOLIN 2 G/1
2 INJECTION, POWDER, FOR SOLUTION INTRAMUSCULAR; INTRAVENOUS
Status: COMPLETED | OUTPATIENT
Start: 2025-02-12 | End: 2025-02-12

## 2025-02-12 RX ORDER — ROCURONIUM BROMIDE 10 MG/ML
INJECTION, SOLUTION INTRAVENOUS
Status: DISCONTINUED | OUTPATIENT
Start: 2025-02-12 | End: 2025-02-12

## 2025-02-12 RX ADMIN — ONDANSETRON 4 MG: 2 INJECTION INTRAMUSCULAR; INTRAVENOUS at 08:02

## 2025-02-12 RX ADMIN — ROCURONIUM BROMIDE 5 MG: 10 INJECTION, SOLUTION INTRAVENOUS at 08:02

## 2025-02-12 RX ADMIN — SUGAMMADEX 200 MG: 100 INJECTION, SOLUTION INTRAVENOUS at 10:02

## 2025-02-12 RX ADMIN — DEXAMETHASONE SODIUM PHOSPHATE 8 MG: 4 INJECTION, SOLUTION INTRA-ARTICULAR; INTRALESIONAL; INTRAMUSCULAR; INTRAVENOUS; SOFT TISSUE at 08:02

## 2025-02-12 RX ADMIN — HYDROMORPHONE HYDROCHLORIDE 0.2 MG: 1 INJECTION, SOLUTION INTRAMUSCULAR; INTRAVENOUS; SUBCUTANEOUS at 10:02

## 2025-02-12 RX ADMIN — OXYCODONE HYDROCHLORIDE AND ACETAMINOPHEN 1 TABLET: 5; 325 TABLET ORAL at 11:02

## 2025-02-12 RX ADMIN — SODIUM CHLORIDE, SODIUM LACTATE, POTASSIUM CHLORIDE, AND CALCIUM CHLORIDE: .6; .31; .03; .02 INJECTION, SOLUTION INTRAVENOUS at 08:02

## 2025-02-12 RX ADMIN — ACETAMINOPHEN 650 MG: 325 TABLET ORAL at 07:02

## 2025-02-12 RX ADMIN — TRANEXAMIC ACID 1000 MG: 10 INJECTION, SOLUTION INTRAVENOUS at 08:02

## 2025-02-12 RX ADMIN — MIDAZOLAM HYDROCHLORIDE 2 MG: 1 INJECTION, SOLUTION INTRAMUSCULAR; INTRAVENOUS at 08:02

## 2025-02-12 RX ADMIN — SUCCINYLCHOLINE CHLORIDE 120 MG: 20 INJECTION, SOLUTION INTRAMUSCULAR; INTRAVENOUS; PARENTERAL at 08:02

## 2025-02-12 RX ADMIN — CHLORHEXIDINE GLUCONATE 0.12% ORAL RINSE 10 ML: 1.2 LIQUID ORAL at 07:02

## 2025-02-12 RX ADMIN — CEFAZOLIN 2 G: 2 INJECTION, POWDER, FOR SOLUTION INTRAMUSCULAR; INTRAVENOUS at 08:02

## 2025-02-12 RX ADMIN — FENTANYL CITRATE 50 MCG: 50 INJECTION, SOLUTION INTRAMUSCULAR; INTRAVENOUS at 08:02

## 2025-02-12 RX ADMIN — GABAPENTIN 300 MG: 300 CAPSULE ORAL at 07:02

## 2025-02-12 RX ADMIN — LIDOCAINE HYDROCHLORIDE 50 MG: 20 INJECTION INTRAVENOUS at 08:02

## 2025-02-12 RX ADMIN — PROPOFOL 150 MG: 10 INJECTION, EMULSION INTRAVENOUS at 08:02

## 2025-02-12 RX ADMIN — ROCURONIUM BROMIDE 40 MG: 10 INJECTION, SOLUTION INTRAVENOUS at 08:02

## 2025-02-12 RX ADMIN — TRANEXAMIC ACID 1000 MG: 10 INJECTION, SOLUTION INTRAVENOUS at 09:02

## 2025-02-12 NOTE — PT/OT/SLP EVAL
Physical Therapy Evaluation and Treatment    Patient Name: Simi Constantino   MRN: [id number]  Recent Surgery: Procedure(s) (LRB):  ARTHROPLASTY, KNEE, TOTAL (Right) Day of Surgery    Recommendations:     Discharge Recommendations: Low Intensity Therapy   Discharge Equipment Recommendations: none   Barriers to discharge: None    Assessment:     Simi Constantino is a 59 y.o. female admitted with a medical diagnosis of Arthritis of right knee. She presents with the following impairments/functional limitations: weakness, impaired endurance, impaired functional mobility, gait instability, impaired balance, pain, decreased safety awareness, decreased lower extremity function, decreased ROM, orthopedic precautions.    Rehab Prognosis: Good; patient would benefit from acute PT services to address these deficits and reach maximum level of function.    Plan:     During this hospitalization, patient to be seen 3 x/week to address the above listed problems via gait training, therapeutic activities, therapeutic exercises    Plan of Care Expires: 02/26/25    Subjective     Chief Complaint: Pt is motivated to participate  Patient Comments/Goals: none stated  Pain/Comfort:  Pain Rating 1: 4/10  Location - Side 1: Right  Location - Orientation 1: generalized  Location 1: knee  Pain Addressed 1: Reposition, Distraction  Pain Rating Post-Intervention 1: 4/10    Social History:  Living Environment: Patient lives with their spouse and daughter in a single story home with number of outside stair(s): 0  Prior Level of Function: Prior to admission, patient was independent, driving and working, and ambulated household and community distances using no AD  Equipment Used at Home: none  DME owned (not currently used): rolling walker  Assistance Upon Discharge: family    Objective:     Communicated with nurse Gaona and epic chart review prior to session. Patient found supine with peripheral IV, wound vac upon PT entry to room.    General Precautions:  Standard, fall   Orthopedic Precautions: RLE weight bearing as tolerated   Braces: N/A    Respiratory Status: Room air    Exams:  Cognition: Patient is oriented to Person, Place, Time, Situation  RLE ROM: WFL  RLE Strength:  NT due to surgery  LLE ROM: WFL  LLE Strength:  Grossly 4/5  Sensation:    -       Intact  Skin Integrity/Edema:     -       Skin integrity: Visible skin intact    Functional Mobility:  Gait belt applied - Yes  Bed Mobility  Rolling Left: contact guard assistance  Scooting: contact guard assistance  Supine to Sit: contact guard assistance  Transfers  Sit to Stand: stand by assistance with rolling walker  Bed to Chair: stand by assistance with rolling walker using Step Transfer  Toilet Transfer: stand by assistance with rolling walker using Step Transfer  Stood at sink to wash hands. INDEP with toileting hygiene.   Gait  Patient ambulated 100ft with rolling walker and stand by assistance. Patient demonstrates occasional unsteady gait and antalgic gait. No c/o dizziness or SOB, no gross LOB. All lines remained intact throughout ambulation trail.  Balance  Sitting: stand by assistance  Standing: stand by assistance    Therapeutic Activities and Exercises:   Pt educated on role of PT in acute care and POC. Educated on R LE WBAT precautions. Educated to keep R knee positioned in extension when supine, nothing should be placed behind the knee. Educated on use of ice, ~20min on and at least 20min off intermittently throughout the day. Educated on ankle pumps to decrease risk of blood clots post-surgery and aid in edema management. Educated on heel slides and quad sets to improve LE ROM and to reduce pain when ambulating. Educated to complete all exercises to pt's tolerance. Educated to ambulate at least 1x per hour when awake. Educated on importance of OOB activities, activity pacing, and HEP (marching/hip flex, hip abd, heel slides/LAQ, quad sets, ankle pumps) in order to maintain/regain strength.  "Encouraged to sit up in chair for all meals. Educated on proper use of RW for safety and to reduce risk of falling. Educated on "call don't fall" policy and increased risk of falling due to weakness, instructed to utilize call bell for assistance with all transfers. Pt agreeable to all requests.    AM-PAC 6 CLICK MOBILITY  Total Score:16    Patient left up in chair with all lines intact, call button in reach, and RN and family present.    GOALS:   Multidisciplinary Problems       Physical Therapy Goals          Problem: Physical Therapy    Goal Priority Disciplines Outcome Interventions   Physical Therapy Goal     PT, PT/OT     Description: Goals to be met by 2/26/25.  1. Pt will complete bed mobility MOD I.  2. Pt will complete sit to stand MOD I.  3. Pt will ambulate 200ft MOD I using RW.  4. Pt will increase AMPAC score by 2 points to progress functional mobility.                       DME Justifications:  No DME recommended requiring DME justifications    History:     Past Medical History:   Diagnosis Date    Vitamin D deficiency        Past Surgical History:   Procedure Laterality Date    COLONOSCOPY      x3    TONSILLECTOMY  1970    TOTAL VAGINAL HYSTERECTOMY  2016       Time Tracking:     PT Received On: 02/12/25  PT Start Time: 1222  PT Stop Time: 1245  PT Total Time (min): 23 min     Billable Minutes: Evaluation 13min and Therapeutic Activity 10min    2/12/2025    "

## 2025-02-12 NOTE — TRANSFER OF CARE
"Anesthesia Transfer of Care Note    Patient: Simi Constantino    Procedure(s) Performed: Procedure(s) (LRB):  ARTHROPLASTY, KNEE, TOTAL (Right)    Patient location: PACU    Anesthesia Type: general    Transport from OR: Transported from OR on room air with adequate spontaneous ventilation    Post pain: adequate analgesia    Post assessment: no apparent anesthetic complications    Post vital signs: stable    Level of consciousness: responds to stimulation    Nausea/Vomiting: no nausea/vomiting    Complications: none    Transfer of care protocol was followed      Last vitals: Visit Vitals  BP (!) 149/72 (BP Location: Right arm, Patient Position: Sitting)   Pulse 60   Temp 36.1 °C (97 °F) (Temporal)   Resp 20   Ht 5' 3" (1.6 m)   Wt 103 kg (226 lb 15.4 oz)   SpO2 100%   Breastfeeding No   BMI 40.20 kg/m²     "

## 2025-02-12 NOTE — DISCHARGE INSTRUCTIONS
Patient instructions for joint replacement      After surgery at home  1.  Take Tylenol 650 mg 3 times a day for 14 days then as needed for mild pain  2.  Take gabapentin 300 mg nightly for 6 weeks  3.  Take diclofenac/Voltaren 75 mg twice a day with food daily for 4 weeks unless having cardiac issues to take for 2 weeks only  4.  Must take aspirin 81 mg twice a day for 6 weeks unless you are on other blood thinners/Plavix, Eliquis, Xarelto, Coumadin etc  5.  Must wear compressive stockings for 6 weeks minimum to decrease the risk of blood clot and swelling  6.  Hydrocodone/Norco or oxycodone/Percocet will be prescribed to take every 6 hr as needed for breakthrough pain  7.  May apply ice on the knee to help with decreasing pain  8.  Keep wound dry for 2 weeks until stitches/staples removed than you will be allowed to shower in 24 hr and get the wound wet.  No soaking of the wound in the tub or swimming for 4 weeks after surgery  9.  No driving for 4 weeks unless specified by physician  10.  Avoid touching the wound or surrounding area /at least 2 inches on each side of the surgical incision until staples are removed/stitches   11.  May change the surgical dressing if extremely bloody or has drainage on it. May clean the wound with peroxide or Betadine and apply sterile dressing and Ace wrap over it  12.  Leave hospital dressing on for 3 days then may change by applying sterile 4 x 4 and Ace wrap after cleaning with Betadine or peroxide.  May leave this dressing change to home health nurses

## 2025-02-12 NOTE — DISCHARGE SUMMARY
[facility] (Hospital)  Discharge Note  Short Stay    Procedure(s) (LRB):  ARTHROPLASTY, KNEE, TOTAL (Right)      OUTCOME: Patient tolerated treatment/procedure well without complication and is now ready for discharge.    DISPOSITION: Home-Health Care [facility]    FINAL DIAGNOSIS:  <principal problem not specified>  Arthritis right knee  FOLLOWUP: In clinic    DISCHARGE INSTRUCTIONS:    Discharge Procedure Orders   Urinalysis   Standing Status: Future Number of Occurrences: 1 Standing Exp. Date: 04/05/26     Order Specific Question Answer Comments   Collection Type Urine, Clean Catch         TIME SPENT ON DISCHARGE:  30 minutes

## 2025-02-12 NOTE — H&P
Subjective:     Patient ID: Simi Constantino is a 59 y.o. female.    Chief Complaint: No chief complaint on file.    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  She used to fall a lot down stairs growing up.  At 1 point Dr.Jared Raya at [facility]  injected her knee with good relief.  Now she is having something moving and catching inside her knee with some swelling.  Any time she tries to bend it she feels something moves in then it will go and get going.  She does go to 5 gym and does quite a bit of quad strengthening and hamstring strengthening exercises and she described which she does.  Now she is working for the [employer] as a nurse.  She took up to 1600 mg of ibuprofen a day to help ease it up and now she is down to 600 mg. She feels some tightness in the knee.  No history of numbness or tingling or back pain or history of gout    No fever no chills no shortness of breath or difficulty with chewing or swallowing loss of bowel bladder control blurry vision double vision or loss of sense smell or taste    03/10/2022  Right knee arthritis.  Last visit we injected her with Depo-Medrol with great relief.  She takes ibuprofen 600 sometimes 800 mg once a day.  Occasional Tylenol.  She still working.  Her pain around 3/10.  With activities it gets really bad.  She is maintaining quite active life and she does exercise.  No fever no chills no shortness of breath or difficulty with chewing or swallowing loss of bowel bladder control blurry vision double vision loss sense smell or taste.  The injection seems to have helped quite a bit that she is not feeling catching locking and we did not proceed with the MRI        12/05/2022   Right knee arthritis.  She stated the Depo-Medrol last time given hurt a lot that day but the next day the pain completely subsided.  She only takes ibuprofen 800 once a day once it wears off.  This time only wore  off like a month ago.  We did discuss taking Tylenol instead of ibuprofen to decrease the risk of gastrointestinal issues as well as kidney issues.  Her pain is 5/10 when she over does it.  She still working at the state.    No fever no chills no shortness of breath or difficulty with chewing swallowing loss of bowel bladder control.  There is no feeling of any catching locking at this time    03/16/2023  Right knee arthritis.  The injections seems to work.  We obtained new x-ray today to compared previously from 2021.  Looking at her records she probably received an injection of steroid twice a year.  She does take occasional ibuprofen but she is worried about developing problems from it.  If she takes maybe once a week and ibuprofen 600.  She is traveling sometime in October and I would like her to come in like 3 4 weeks prior to see if she would like steroid injection.  She is not at a point that she knees viscosupplementation.  She keeps active as a nurse.  Today her pain is 0-2/10 however was much higher a week ago it eased up a little bit.  No fever no chills no shortness of breath or difficulty with chewing swallowing loss of bowel bladder control blurry vision double vision loss sense smell or taste        07/06/2023   Right knee arthritis/medial meniscus tear.  Last visit on 03/16/2023 she received Depo-Medrol injection.  She occasionally takes ibuprofen 800 over-the-counter and or Tylenol.  She does not take it constantly.  The other day she stepped in a hole in the yd and started with pain.  She works all day long in the "[employer]d in the evening she gets pain.  We discussed meloxicam as alternative to ibuprofen and that she can not take it with the ibuprofen and she wants to try it.  She is planning on taking a trip to [place] and will require a lot of walking to words the later part of October and she wants to come in just in case she needs an injection prior to the trip.  Occasionally the knee becomes swollen  and tight and I did tell her that is the nature of the arthritis it comes and goes and sometimes might take a day or 2 before the swelling goes away.  Does days she needs to take the anti-inflammatory and/or Tylenol.  These stays are infrequent.  Pain today 0/10 doing really well    06/27/2024   Right knee arthritis/you meniscus tear.  Depo-Medrol 03/16/2023 seems to help.  She wants another injection.  She is taking ibuprofen on as needed basis and sometimes Tylenol.  She did go on her trip and enjoy did.  However recently she lost her mother who was another patient of mine.  She is still working and she is still ambulating without any assistive devices.  We did warn her about taking too much anti-inflammatories.    No fever no chills no shortness of breath or difficulty with chewing swallowing   In the sitting position her pain 0/10 when she is becomes more active it goes up to 3/10  X-ray obtained today    10/28/2024   Right knee arthritis with varus deformity went over the x-rays from last visit with her.  Showed her that she has bone-on-bone right now in the right knee.  The left knee she has started with osteophyte underneath the patella very mild narrowing medially.  Depo-Medrol given 06/27/2024 seems to only helped for around 3 weeks.  She has been suffering after that.  The ibuprofen gave him severe bruising then she was switch to meloxicam and seems not helping.  Her knee is catching locking popping swelling up.  It is so painful at this time.  She has feels that the knee is weak.  She is using Voltaren gel topical.  No fever no chills no shortness of breath or difficulty with chewing swallowing loss of sense smell or taste    12/16/2024   Right knee severe arthritis and varus deformity.  She stated that physical therapy at [facility] helped quite a bit making her stronger.  She is able to do leg lifts in the exercises much better than before.  She said however she is having quite severe of pain and catching  on the inside of the knee and grinding underneath the kneecap.  Standing for any period more than 30 minutes seems to be bothering her quite a bit any twisting motion with severe catching.  Pain 5/10.  Had tried right knee Depo-Medrol 06/27/2024 and right knee Kenalog 10/28/2024 which helped significantly for 10 days.  Physical therapy helps but it makes her hurt later on but she felt that it did help with the strength in the walking.  She is not limping as much as used to be  She came to terms that she had tried numerous NSAIDs in the past and will not like proceed with total knee replacement in February 20, 2025.  We went over total knee replacement in details went over the x-rays.  Went over the pros and cons of surgery and the instructions.  Now she works as a  on a computer sitting down.  I did tell her most people would be out of work for 3 months but if she is doing well and you wants to return within 4 weeks I see no objections     She is not allergic to any medications at this time.  She can wear fake jewelry without problems  Past Medical History:   Diagnosis Date    Vitamin D deficiency      Past Surgical History:   Procedure Laterality Date    COLONOSCOPY      x3    TONSILLECTOMY  1970    TOTAL VAGINAL HYSTERECTOMY  2016     Family History   Problem Relation Name Age of Onset    Cancer Mother          Liver cancer    Hyperlipidemia Mother      Cancer Father          prostate cancer    Charcot-Catarina-Tooth disease Father      No Known Problems Sister      No Known Problems Brother      No Known Problems Maternal Grandmother      No Known Problems Maternal Grandfather      No Known Problems Paternal Grandmother      No Known Problems Paternal Grandfather       Social History     Socioeconomic History    Marital status:    Tobacco Use    Smoking status: Never    Smokeless tobacco: Never   Substance and Sexual Activity    Alcohol use: Yes     Alcohol/week: 1.0 standard drink of alcohol      Types: 1 Glasses of wine per week     Medication List with Changes/Refills   Current Medications    DICLOFENAC (VOLTAREN) 75 MG EC TABLET    Take 1 tablet (75 mg total) by mouth 2 (two) times daily as needed (Pain). Take with food    MELOXICAM (MOBIC) 15 MG TABLET    Take 1 tablet (15 mg total) by mouth once daily. Take with food    TERBINAFINE HCL (LAMISIL) 250 MG TABLET    Take 1 tablet (250 mg total) by mouth once daily.    VITAMIN D (VITAMIN D3) 1000 UNITS TAB    Take 185 mg by mouth.     Review of patient's allergies indicates:  No Known Allergies  Review of Systems   Constitutional: Negative for decreased appetite.   HENT:  Negative for tinnitus.    Eyes:  Negative for double vision.   Cardiovascular:  Negative for chest pain.   Respiratory:  Negative for wheezing.    Hematologic/Lymphatic: Negative for bleeding problem.   Skin:  Negative for dry skin.   Musculoskeletal:  Positive for joint pain, joint swelling and stiffness. Negative for arthritis, back pain, gout and neck pain.   Gastrointestinal:  Negative for abdominal pain.   Genitourinary:  Negative for bladder incontinence.   Neurological:  Negative for numbness, paresthesias and sensory change.   Psychiatric/Behavioral:  Negative for altered mental status.        Objective:   There is no height or weight on file to calculate BMI.  There were no vitals filed for this visit.       General    Constitutional: She is oriented to person, place, and time. She appears well-developed.   HENT:   Head: Atraumatic.   Eyes: EOM are normal.   Cardiovascular:  Normal rate.            Pulmonary/Chest: Effort normal.   Neurological: She is alert and oriented to person, place, and time.   Psychiatric: Judgment normal.           Ambulating with very slight limp.    Pelvis is level  Bilateral hips full motion no pain in the groin  Hip flexors, abductors, adductors, quads, hamstrings, ankle extensors and flexors are 5/5  Left knee with full motion no pain.  Collaterals and  cruciates are stable.  Mild crepitus anteriorly, no warmness to touch no swelling.  No defect in the patella or quadriceps tendon  Right knee mild swelling.  Medial joint pain to range of motion and to palpation.  Positive Meghann sign medially.  There is some crepitus to compression on the patella.  There is a plica  you can feel on the lateral femoral condyle.  Collaterals and cruciates are stable.  Range of motion 0-120 degrees.  7° of varus deformity.  No defect in the patella or quadriceps tendon  Calves are soft nontender  Ankle motion is intact able to move the ankle up and down on the right  Negative straight leg raising    Relevant imaging results reviewed and interpreted by me, discussed with the patient and / or family today     X-ray 06/27/2024 bilateral knees with the right knee  complete loss of joint space which has progressed since last year.  Sclerosis of the bone small cystic changes and varus deformity.  The left knee very mild degenerative changes.  No evidence of fracture  X-ray 03/16/2023 bilateral knees with the right knee still moderate medial joint narrowing similar to x-ray from 11/11/2021.  There is small marginal osteophytic changes.  The left knee seems to be very mild if any medial joint narrowing.  No osteophytes seen no fractures.  Quality of the bone is excellent  X-ray 11/11/2021 bilateral knees with the right knee moderate medial joint loss with marginal osteophytes most pronounced medially in and slightly anteriorly.  There is no evidence of fracture with mild effusion.  Left knee with very mild if any medial joint narrowing.  No fracture no osteophytes seen      Assessment:     Encounter Diagnoses   Name Primary?    Arthritis of knee, right Yes    Acquired varus deformity knee, right     Chondromalacia, left knee           Plan:   Arthritis of knee, right  -     Place in Outpatient; Standing  -     Vital signs; Standing  -     Insert peripheral IV; Standing  -     Surgeon  requests no aspirin,enoxaparin,warfarin,clopidogrel,prasugrel or dabigatran; Standing  -     Clip and Prep Right Anterior Knee Mid Thigh to Mid Calf; Standing  -     Foot pump; send to OR with patient; Standing  -     Cleanse with Chlorhexidine (CHG); Standing  -     Apply Nozin; Standing  -     Diet NPO; Standing  -     Place BEENA hose; Standing  -     Place sequential compression device; Standing  -     Chlorohexidine Gluconate Bath; Standing  -     Nasal ; Standing    Preop testing  -     Urinalysis; Future; Expected date: 02/04/2025    Arthritis of right knee    Other orders  -     0.9% NaCl infusion  -     IP VTE LOW RISK PATIENT; Standing  -     ceFAZolin 2 g  -     chlorhexidine 0.12 % solution 10 mL  -     dexAMETHasone injection 8 mg  -     tranexamic acid in NaCl,iso-os IVPB 1,000 mg  -     gabapentin capsule 300 mg  -     acetaminophen tablet 650 mg           There are no outpatient Patient Instructions on file for this admission.    The pros and cons of steroid injection total knee arthroplasty discussed.  Also the pros and cons of NSAIDs/ibuprofen/meloxicam/Voltaren.   Procedure, common risks and benefits,alternatives discussed in details.All questions answered.Patient expressed understanding.Patient instructed to call for any questions that could arise in the future.    Most common Risks:  Infection  Leg-length discrepancy  Dislocation  Neuro-vascular injury ( resulting in loss of motor and sensory functions)  Pain  Blood clot  Fat clot  Loss of motion  Fracture of bone  Failure of procedure to achieve its intended purpose  Failure of hardware  Non-union or mal-union of bone  Malalignment  Death         The mobility limitation can not be sufficiently resolved by the use of a cane.  Patient's functional mobility deficit can be sufficiently resolved with the use of a rolling walker.  Patient has mobility limitation significantly impairs their ability to participate in 1 or more activities of  daily living.  The use of a rolling walker we will significantly improve the patient's ability to participate in  MRADLS and it is to be used on a regular basis in the home.            Disclaimer: This note was prepared using a voice recognition system and is likely to have sound alike errors within the text.

## 2025-02-12 NOTE — OP NOTE
[facility] ([facility])  Orthopedic Surgery  Operative Note    SUMMARY     Date of Procedure: 2/12/2025     Procedure: Procedure(s) (LRB):  ARTHROPLASTY, KNEE, TOTAL (Right)       Surgeons and Role:     * Arden Sullivan MD - Primary     * Serena Thomas PA - Assisting  JOHANN CHAMPAGNE      Pre-Operative Diagnosis: Arthritis of knee, right [M17.11]    Post-Operative Diagnosis: Post-Op Diagnosis Codes:     * Arthritis of knee, right [M17.11]    Anesthesia: General    Injections/Meds: NADIYA with 40cc NS    Tourniquet time:  NO TOURNIQUET    Significant Surgical Tasks Conducted by the Assistant(s), if Applicable:    To hold multiple retractors to protect ligaments and neurovascular structures in order to perform surgery safely and efficiently.    Complications: none     Estimated Blood Loss (EBL):  200 mL           [facility]  [facility] DEPUY attune posterior stabilized knee system femur size 5. Narrow right, tibial tray size 4., 14 x 50 mm tibial stem (BMI 40.20), tibial insert 5-8 mm PS, patella offset 32, gentamicin cement by Jukedeck    Specimens: None           Condition: Good  Sterile Betadine irrigation solution used/surgiphore  Disposition: PACU - hemodynamically stable.    Attestation: I performed the procedure.    Description:  Medial parapatellar approach used to perform right TKA.  After patient received antibiotics and preop meds the knee was prepped and draped in usual sterile fashion. Midline incision was made 3 fingers above the superior pole of the patella to 3 fingers below.  Full-thickness skin flap raised medially and partially laterally.  Medial parapatellar incision was made to medial tibial tubercle  Medial release off the medial tibial flare perform subperiosteal elevating the tissues to the posterior medial corner of the tibia.  Patellar fat pad resected partially.  Superior capsulectomy performed.  Osteophytes removed mediolateral meniscus removed. Patella was  resurfaced after measuring and free hand technique used.  Partial lateral facetectomy performed.  Patella button trial was applied and the measured and reconstituted thickness. The patella was moved laterally the knee hyperflexed.  Quarter-inch drill bit used to open the canal into the femur and the canal was suctioned.  Irrigated and suctioned again.  Canal finder inserted an intramedullary alignment anand inserted into the femoral canal and pinned our distal cutting block at 5° of valgus cut. The anand was removed and distal femur was cut through the cutting block. We measured the size of the femur and marked our rotation and then 1 cutting block applied and pinned in place and proceeded cutting the anterior condyle posterior condyle and chamfer cuts followed by box cut. The femoral canal was bone grafted.  Trial was applied on the femur and posterior osteophytes removed. Directed attention to the tibia quarter-inch drill bit used to open the canal into the tibia.  Canal Finders inserted.  Irrigated the canal and suctioned it. Fluted intramedullary alignment anand applied and using the depth gauge and the cutting 4° posterior tibial cutting block pinned on the tibia.  Proceeded with multiple bent Homans protecting the collateral ligaments and posterior neurovascular structures and using the oscillating saw cut the tibia.  Excess osteophytes removed. Measured the tibia, marked the rotation on the base plate , pinned in place and punched our Savi.  Trials placed into the knee and put the knee through range of motion checking gap in extension , flexion at 45° and 90 degree of flexion.  Confirmed the sizes.    The knee was pulse lavaged then was injected in the surrounding soft tissues for postop pain control.  Prosthesis was cemented in place and excess cement was removed. Once cement has hardened the knee was placed through range of motion confirming stability. Once prosthesis was checked the  Closure of the knee  performed with 1.  Vicryl and figure-of-eight and running fashion. Subcutaneous tissues were irrigated and then closed using 1.  And 2-0 Vicryl inverted stitch and skin using Stratafix 3-0 in a running subcuticular fashion.  Taya dressing applied.  Sterile dressing applied.

## 2025-02-12 NOTE — PLAN OF CARE
[facility] ([facility])  Discharge Assessment    Primary Care Provider: Unable, To Obtain     Discharge Assessment (most recent)       BRIEF DISCHARGE ASSESSMENT - 02/12/25 1051          Discharge Planning    Assessment Type Discharge Planning Brief Assessment     Resource/Environmental Concerns none     Support Systems Children     Equipment Currently Used at Home walker, rolling     Current Living Arrangements home     Patient/Family Anticipates Transition to home with help/services     Patient/Family Anticipated Services at Transition home health care     DME Needed Upon Discharge  none     Discharge Plan A Home Health                   Spoke with daughter, Roshni,  for brief assessment.   Help at home after discharge: daughter  DME needed:  none,has RW   Discharge plan:  Home Health - orders sent to [facility] for nursing/PT.

## 2025-02-12 NOTE — PLAN OF CARE
PT EVAL complete. Required CGA for bed mobility, ambulated 100ft SBA using RW. Recommending low intensity therapy upon d/c.

## 2025-02-12 NOTE — ANESTHESIA PREPROCEDURE EVALUATION
02/12/2025  Simi Constantino is a 59 y.o., female.      Pre-op Assessment    I have reviewed the Patient Summary Reports.     I have reviewed the Nursing Notes. I have reviewed the NPO Status.      Review of Systems  Anesthesia Hx:  No problems with previous Anesthesia                Hematology/Oncology:  Hematology Normal   Oncology Normal                                   Renal/:  Renal/ Normal                 Hepatic/GI:  Hepatic/GI Normal                    Neurological:  Neurology Normal                                      Endocrine:  Endocrine Normal            Dermatological:  Skin Normal    Psych:  Psychiatric Normal                  Patient Active Problem List   Diagnosis    Hypertriglyceridemia    Vitamin D deficiency    BMI 40.0-44.9, adult    Menopause    Seasonal allergies    Uterine leiomyoma    Preop cardiovascular exam    Arthritis of knee, right    BMI 39.0-39.9,adult    Onychomycosis         Physical Exam  General: Well nourished, Cooperative, Alert and Oriented    Airway:  Mallampati: II / II  Mouth Opening: Normal  TM Distance: Normal  Tongue: Normal  Neck ROM: Normal ROM    Dental:  Intact        Anesthesia Plan  Type of Anesthesia, risks & benefits discussed:    Anesthesia Type: Gen ETT  Intra-op Monitoring Plan: Standard ASA Monitors  Post Op Pain Control Plan: multimodal analgesia  Induction:  IV  Airway Plan: Direct  Informed Consent: Informed consent signed with the Patient and all parties understand the risks and agree with anesthesia plan.  All questions answered.   ASA Score: 2  Day of Surgery Review of History & Physical: H&P Update referred to the surgeon/provider.I have interviewed and examined the patient. I have reviewed the patient's H&P dated: There are no significant changes. H&P completed by Anesthesiologist.    Ready For Surgery From Anesthesia Perspective.     .

## 2025-02-13 NOTE — ANESTHESIA POSTPROCEDURE EVALUATION
Anesthesia Post Evaluation    Patient: Simi Constantino    Procedure(s) Performed: Procedure(s) (LRB):  ARTHROPLASTY, KNEE, TOTAL (Right)    Final Anesthesia Type: general      Patient location during evaluation: PACU  Patient participation: Yes- Able to Participate  Level of consciousness: awake and alert, oriented and awake  Post-procedure vital signs: reviewed and stable  Pain management: adequate  Airway patency: patent    PONV status at discharge: No PONV  Anesthetic complications: no      Cardiovascular status: blood pressure returned to baseline  Respiratory status: unassisted  Hydration status: euvolemic  Follow-up not needed.              Vitals Value Taken Time   /64 02/12/25 1145   Temp 36.3 °C (97.4 °F) 02/12/25 1025   Pulse 69 02/12/25 1152   Resp 66 02/12/25 1151   SpO2 99 % 02/12/25 1152   Vitals shown include unfiled device data.      Event Time   Out of Recovery 11:53:23         Pain/Chidi Score: Pain Rating Prior to Med Admin: 5 (2/12/2025 11:45 AM)  Chidi Score: 10 (2/12/2025 11:56 AM)

## 2025-02-19 ENCOUNTER — PATIENT MESSAGE (OUTPATIENT)
Dept: ORTHOPEDICS | Facility: CLINIC | Age: 60
End: 2025-02-19
Payer: COMMERCIAL

## 2025-02-27 ENCOUNTER — HOSPITAL ENCOUNTER (OUTPATIENT)
Dept: RADIOLOGY | Facility: HOSPITAL | Age: 60
Discharge: HOME | End: 2025-02-27
Attending: ORTHOPAEDIC SURGERY
Payer: COMMERCIAL

## 2025-02-27 ENCOUNTER — OFFICE VISIT (OUTPATIENT)
Dept: ORTHOPEDICS | Facility: CLINIC | Age: 60
End: 2025-02-27
Payer: COMMERCIAL

## 2025-02-27 VITALS — HEIGHT: 63 IN | WEIGHT: 226 LBS | BODY MASS INDEX: 40.04 KG/M2

## 2025-02-27 DIAGNOSIS — M17.11 ARTHRITIS OF KNEE, RIGHT: ICD-10-CM

## 2025-02-27 DIAGNOSIS — Z96.651 STATUS POST TOTAL RIGHT KNEE REPLACEMENT USING CEMENT: Primary | ICD-10-CM

## 2025-02-27 PROCEDURE — 73564 X-RAY EXAM KNEE 4 OR MORE: CPT | Mod: 26,RT,, | Performed by: STUDENT IN AN ORGANIZED HEALTH CARE EDUCATION/TRAINING PROGRAM

## 2025-02-27 PROCEDURE — 1159F MED LIST DOCD IN RCRD: CPT | Mod: CPTII,S$GLB,, | Performed by: PHYSICIAN ASSISTANT

## 2025-02-27 PROCEDURE — 73564 X-RAY EXAM KNEE 4 OR MORE: CPT | Mod: TC,RT

## 2025-02-27 PROCEDURE — 99999 PR PBB SHADOW E&M-EST. PATIENT-LVL III: CPT | Mod: PBBFAC,,, | Performed by: PHYSICIAN ASSISTANT

## 2025-02-27 PROCEDURE — 99024 POSTOP FOLLOW-UP VISIT: CPT | Mod: S$GLB,,, | Performed by: PHYSICIAN ASSISTANT

## 2025-02-27 PROCEDURE — 73562 X-RAY EXAM OF KNEE 3: CPT | Mod: 26,59,LT, | Performed by: STUDENT IN AN ORGANIZED HEALTH CARE EDUCATION/TRAINING PROGRAM

## 2025-02-27 NOTE — PROGRESS NOTES
Patient ID: Simi Constantino is a 59 y.o. female.    Chief Complaint: Post-op Evaluation of the Right Knee      HPI: Simi Constantino  is a 59 y.o. female who c/o Post-op Evaluation of the Right Knee     Post op visit 1   Patient notes pain is 0/10  Patient states she is doing quite well although is eager to make even more advancement   She is very thankful for us for having performed the surgery   She is using a Rollator/walker to assist with ambulation today  Compliant with all postop instructions  Compliant with PT; she would like to go back to [place] as she was doing before surgery close to her home.    She is taking over-the-counter medications; no longer taking any narcotic pain medication.  She does have some left should she need for outpatient therapy    Patient is presently denying any shortness of breath, chest pain, fever/chills, nausea/vomiting, loss of taste or smell, numbness/tingling or sensation changes, loss of bladder or bowel function, loss of taste/smell.     Surgery: Right Total Knee    Surgery Date:  2/12/25    Past Medical History:   Diagnosis Date    Vitamin D deficiency      Past Surgical History:   Procedure Laterality Date    COLONOSCOPY      x3    TONSILLECTOMY  1970    TOTAL KNEE ARTHROPLASTY Right 2/12/2025    Procedure: ARTHROPLASTY, KNEE, TOTAL;  Surgeon: Arden Sullivan MD;  Location: [facility];  Service: Orthopedics;  Laterality: Right;    TOTAL VAGINAL HYSTERECTOMY  2016     Family History   Problem Relation Name Age of Onset    Cancer Mother          Liver cancer    Hyperlipidemia Mother      Cancer Father          prostate cancer    Charcot-Catarina-Tooth disease Father      No Known Problems Sister      No Known Problems Brother      No Known Problems Maternal Grandmother      No Known Problems Maternal Grandfather      No Known Problems Paternal Grandmother      No Known Problems Paternal Grandfather       Social History[1]  Medication List with Changes/Refills   Current  Medications    ACETAMINOPHEN (TYLENOL) 650 MG TBSR    Take 650 mg by mouth every 8 (eight) hours.    DICLOFENAC (VOLTAREN) 75 MG EC TABLET    Take 1 tablet (75 mg total) by mouth 2 (two) times daily as needed (Pain). Take with food    GABAPENTIN (NEURONTIN) 300 MG CAPSULE    Take 1 capsule (300 mg total) by mouth every evening.    ONDANSETRON (ZOFRAN) 4 MG TABLET    Take 1 tablet (4 mg total) by mouth every 6 (six) hours as needed for Nausea.    OXYCODONE-ACETAMINOPHEN (PERCOCET)  MG PER TABLET    Take 1 tablet by mouth every 6 (six) hours as needed for Pain.    TERBINAFINE HCL (LAMISIL) 250 MG TABLET    Take 1 tablet (250 mg total) by mouth once daily.    VITAMIN D (VITAMIN D3) 1000 UNITS TAB    Take 185 mg by mouth.     Review of patient's allergies indicates:  No Known Allergies    Objective:     Right Lower Extremity  NVI  WWP foot  Comp soft  Cap refill < 2 sec  Calf NT, Soft  (-) Melinda sign  SAURABH  ROM : Patient is able to easily exhibit full flexion and extension on passive range of motion.   Wiggles toes  DF/PF intact  Sensation intact  Inc C/D/I; subQ closure  No SOI    IMAGING  FINDINGS:  Status post total knee arthroplasty in good positioning with expected postop change.     Impression:     As above.    Assessment:       Encounter Diagnosis   Name Primary?    Status post total right knee replacement using cement Yes          Plan:       Simi was seen today for post-op evaluation.    Diagnoses and all orders for this visit:    Status post total right knee replacement using cement        Simi Constantino is an established pt here for postop follow-up after right total knee replacement by Dr. Sullivan.  The incision was cleaned with hydrogen peroxide.  No staples removed a subcu closure noted today.  The patient was instructed not to soak the incision in standing water but may clean the incision with clean running water and antibacterial soap.  Patient should notify the office of any signs or symptoms of  infection including fevers, erythema, purulent drainage, increasing pain.  Patient will continue with DVT prophylaxis.  Patient will start outpatient physical therapy.  Will follow-up in 4-6 weeks.  Patient verbalized understanding of all instructions and agreed with the above plan.    No follow-ups on file.    The patient understands, chooses and consents to this plan and accepts all   the risks which include but are not limited to the risks mentioned above.     Disclaimer: This note was prepared using a voice recognition system and is likely to have sound alike errors within the text.            [1]   Social History  Socioeconomic History    Marital status:    Tobacco Use    Smoking status: Never    Smokeless tobacco: Never   Substance and Sexual Activity    Alcohol use: Yes     Alcohol/week: 1.0 standard drink of alcohol     Types: 1 Glasses of wine per week

## 2025-03-12 ENCOUNTER — PATIENT MESSAGE (OUTPATIENT)
Dept: ORTHOPEDICS | Facility: CLINIC | Age: 60
End: 2025-03-12
Payer: COMMERCIAL

## 2025-03-18 ENCOUNTER — PATIENT MESSAGE (OUTPATIENT)
Dept: ORTHOPEDICS | Facility: CLINIC | Age: 60
End: 2025-03-18
Payer: COMMERCIAL

## 2025-03-27 ENCOUNTER — OFFICE VISIT (OUTPATIENT)
Dept: INTERNAL MEDICINE | Facility: CLINIC | Age: 60
End: 2025-03-27
Payer: COMMERCIAL

## 2025-03-27 VITALS
OXYGEN SATURATION: 100 % | RESPIRATION RATE: 20 BRPM | WEIGHT: 225.31 LBS | TEMPERATURE: 95 F | SYSTOLIC BLOOD PRESSURE: 126 MMHG | BODY MASS INDEX: 39.92 KG/M2 | HEIGHT: 63 IN | DIASTOLIC BLOOD PRESSURE: 84 MMHG | HEART RATE: 70 BPM

## 2025-03-27 DIAGNOSIS — L02.214 ABSCESS OF RIGHT GROIN: Primary | ICD-10-CM

## 2025-03-27 DIAGNOSIS — Z96.651 S/P TOTAL KNEE REPLACEMENT, RIGHT: ICD-10-CM

## 2025-03-27 PROCEDURE — 3079F DIAST BP 80-89 MM HG: CPT | Mod: CPTII,S$GLB,, | Performed by: PHYSICIAN ASSISTANT

## 2025-03-27 PROCEDURE — 99214 OFFICE O/P EST MOD 30 MIN: CPT | Mod: S$GLB,,, | Performed by: PHYSICIAN ASSISTANT

## 2025-03-27 PROCEDURE — 3008F BODY MASS INDEX DOCD: CPT | Mod: CPTII,S$GLB,, | Performed by: PHYSICIAN ASSISTANT

## 2025-03-27 PROCEDURE — 1160F RVW MEDS BY RX/DR IN RCRD: CPT | Mod: CPTII,S$GLB,, | Performed by: PHYSICIAN ASSISTANT

## 2025-03-27 PROCEDURE — 3074F SYST BP LT 130 MM HG: CPT | Mod: CPTII,S$GLB,, | Performed by: PHYSICIAN ASSISTANT

## 2025-03-27 PROCEDURE — 1159F MED LIST DOCD IN RCRD: CPT | Mod: CPTII,S$GLB,, | Performed by: PHYSICIAN ASSISTANT

## 2025-03-27 PROCEDURE — 99999 PR PBB SHADOW E&M-EST. PATIENT-LVL IV: CPT | Mod: PBBFAC,,, | Performed by: PHYSICIAN ASSISTANT

## 2025-03-27 RX ORDER — MUPIROCIN 20 MG/G
OINTMENT TOPICAL 2 TIMES DAILY
Qty: 30 G | Refills: 0 | Status: SHIPPED | OUTPATIENT
Start: 2025-03-27

## 2025-03-27 RX ORDER — SULFAMETHOXAZOLE AND TRIMETHOPRIM 800; 160 MG/1; MG/1
1 TABLET ORAL 2 TIMES DAILY
Qty: 14 TABLET | Refills: 0 | Status: SHIPPED | OUTPATIENT
Start: 2025-03-27 | End: 2025-04-03

## 2025-03-27 NOTE — PROGRESS NOTES
"Subjective:      Patient ID: Simi Constantino is a 59 y.o. female.    Chief Complaint: Abscess (She is here due to an abscess in her right side of her groin area, states she has had these before and would like to discuss next step would be to deal with them. )    Patient is new to clinic, being seen today for abscess to R groin.  Present x5 days   Denies pain, no drainage   Lesion near groin/vagina, occurred near where she shaves   Reports she has gotten abscesses in the past    Treatment includes Augmentin she had at home (bid for 5 days)     Recently (2/12) w total R knee replacement   [facility] 2/27, [facility] 4/10   Denies erythema of knee, swelling and mild warmth but has been present since post-op   Denies knee pain, currently undergoing PT (one session left)    Last check up July, previous PCP darlin Blankenship, does not have one currently       Review of Systems   Constitutional:  Negative for chills, diaphoresis and fever.   HENT:  Negative for congestion, rhinorrhea and sore throat.    Respiratory:  Negative for cough, shortness of breath and wheezing.    Gastrointestinal:  Negative for abdominal pain, constipation, diarrhea, nausea and vomiting.   Musculoskeletal:  Positive for arthralgias.   Skin:  Positive for wound. Negative for rash.   Neurological:  Negative for dizziness, light-headedness and headaches.       Objective:   /84 (BP Location: Right arm, Patient Position: Sitting)   Pulse 70   Temp (!) 95.2 °F (35.1 °C) (Tympanic)   Resp 20   Ht 5' 3" (1.6 m)   Wt 102.2 kg (225 lb 5 oz)   SpO2 100%   BMI 39.91 kg/m²   Physical Exam  Exam conducted with a chaperone present (Ana Deutsch MA).   Constitutional:       General: She is not in acute distress.     Appearance: She is well-developed. She is not ill-appearing or diaphoretic.   HENT:      Head: Normocephalic and atraumatic.      Right Ear: External ear normal.      Left Ear: External ear normal.   Eyes:      General: Lids are normal.         Right " eye: No discharge.         Left eye: No discharge.      Conjunctiva/sclera: Conjunctivae normal.      Right eye: Right conjunctiva is not injected.      Left eye: Left conjunctiva is not injected.   Pulmonary:      Effort: Pulmonary effort is normal. No respiratory distress.   Genitourinary:      Musculoskeletal:      Right knee: Swelling (mild warmth) present. No erythema or bony tenderness. Normal range of motion.   Skin:     General: Skin is warm and dry.      Findings: No rash.   Neurological:      Mental Status: She is alert and oriented to person, place, and time.   Psychiatric:         Speech: Speech normal.         Behavior: Behavior normal.         Thought Content: Thought content normal.         Judgment: Judgment normal.       Assessment:      1. Abscess of right groin    2. S/P total knee replacement, right       Plan:   Abscess of right groin  -     sulfamethoxazole-trimethoprim 800-160mg (BACTRIM DS) 800-160 mg Tab; Take 1 tablet by mouth 2 (two) times daily. for 7 days  Dispense: 14 tablet; Refill: 0  -     mupirocin (BACTROBAN) 2 % ointment; Apply topically 2 (two) times daily.  Dispense: 30 g; Refill: 0    S/P total knee replacement, right      Recommend warm compresses     Take antibiotics for entire course.  Consider yogurt or probiotic while on antibiotic to prevent GI upset.    Do not save medications for later, all medications must be taken for full regimen.    Requests to establish care with ana August to be scheduled     Discussed worsening signs/symptoms and when to return to clinic or go to ED.   Patient expresses understanding and agrees with treatment plan.

## 2025-04-09 ENCOUNTER — EXTERNAL HOME HEALTH (OUTPATIENT)
Dept: HOME HEALTH SERVICES | Facility: HOSPITAL | Age: 60
End: 2025-04-09
Payer: COMMERCIAL

## 2025-04-10 ENCOUNTER — OFFICE VISIT (OUTPATIENT)
Dept: ORTHOPEDICS | Facility: CLINIC | Age: 60
End: 2025-04-10
Payer: COMMERCIAL

## 2025-04-10 VITALS — BODY MASS INDEX: 39.87 KG/M2 | HEIGHT: 63 IN | WEIGHT: 225 LBS

## 2025-04-10 DIAGNOSIS — Z96.651 STATUS POST TOTAL RIGHT KNEE REPLACEMENT USING CEMENT: Primary | ICD-10-CM

## 2025-04-10 PROCEDURE — 1159F MED LIST DOCD IN RCRD: CPT | Mod: CPTII,S$GLB,, | Performed by: PHYSICIAN ASSISTANT

## 2025-04-10 PROCEDURE — 99024 POSTOP FOLLOW-UP VISIT: CPT | Mod: S$GLB,,, | Performed by: PHYSICIAN ASSISTANT

## 2025-04-10 PROCEDURE — 99999 PR PBB SHADOW E&M-EST. PATIENT-LVL III: CPT | Mod: PBBFAC,,, | Performed by: PHYSICIAN ASSISTANT

## 2025-04-10 PROCEDURE — 1160F RVW MEDS BY RX/DR IN RCRD: CPT | Mod: CPTII,S$GLB,, | Performed by: PHYSICIAN ASSISTANT

## 2025-04-10 RX ORDER — NAPROXEN SODIUM 220 MG/1
81 TABLET, FILM COATED ORAL DAILY
COMMUNITY

## 2025-04-10 NOTE — PROGRESS NOTES
Patient ID: Simi Cosntantino is a 59 y.o. female.    Chief Complaint: Post-op Evaluation of the Right Knee      HPI: Simi Constantino  is a 59 y.o. female who c/o Post-op Evaluation of the Right Knee     Post op visit 2  Patient notes pain is 0/10   The patient is doing wonderful since surgery she is very pleased with her results   She is very thankful for us   She has been able to advance activity of daily living and thus her quality of life   She completed therapy regimen within 1 month they could not believe her progress   She is walking without any devices   She has returned to driving as well as work   She states she does her home exercises and uses the stairs at work since she does not have stairs at home  She is no longer taking any narcotic pain medication using over-the-counter medication     Aside the patient noticed an abscess to her right groin  She was seen in urgent care and given Bactrim DS as well as Bactroban ointment   She notes she has a custom to getting these especially during the warm months   I told her treatment was appropriate   We discussed adding the mupirocin to her nostrils daily    Patient is presently denying any shortness of breath, chest pain, fever/chills, nausea/vomiting, loss of taste or smell, numbness/tingling or sensation changes, loss of bladder or bowel function, loss of taste/smell.     Surgery: Right Total Knee    Surgery Date:  02/12/2025    Past Medical History:   Diagnosis Date    Vitamin D deficiency      Past Surgical History:   Procedure Laterality Date    COLONOSCOPY      x3    TONSILLECTOMY  1970    TOTAL KNEE ARTHROPLASTY Right 2/12/2025    Procedure: ARTHROPLASTY, KNEE, TOTAL;  Surgeon: Arden Sullivan MD;  Location: [facility];  Service: Orthopedics;  Laterality: Right;    TOTAL VAGINAL HYSTERECTOMY  2016     Family History   Problem Relation Name Age of Onset    Cancer Mother          Liver cancer    Hyperlipidemia Mother      Cancer Father          prostate  cancer    Charcot-Catarina-Tooth disease Father      No Known Problems Sister      No Known Problems Brother      No Known Problems Maternal Grandmother      No Known Problems Maternal Grandfather      No Known Problems Paternal Grandmother      No Known Problems Paternal Grandfather       Social History[1]  Medication List with Changes/Refills   Current Medications    ACETAMINOPHEN (TYLENOL) 650 MG TBSR    Take 650 mg by mouth every 8 (eight) hours.    ASPIRIN 81 MG CHEW    Take 81 mg by mouth once daily.    DICLOFENAC (VOLTAREN) 75 MG EC TABLET    Take 1 tablet (75 mg total) by mouth 2 (two) times daily as needed (Pain). Take with food    GABAPENTIN (NEURONTIN) 300 MG CAPSULE    Take 1 capsule (300 mg total) by mouth every evening.    MUPIROCIN (BACTROBAN) 2 % OINTMENT    Apply topically 2 (two) times daily.    ONDANSETRON (ZOFRAN) 4 MG TABLET    Take 1 tablet (4 mg total) by mouth every 6 (six) hours as needed for Nausea.    OXYCODONE-ACETAMINOPHEN (PERCOCET)  MG PER TABLET    Take 1 tablet by mouth every 6 (six) hours as needed for Pain.    TERBINAFINE HCL (LAMISIL) 250 MG TABLET    Take 1 tablet (250 mg total) by mouth once daily.    VITAMIN D (VITAMIN D3) 1000 UNITS TAB    Take 185 mg by mouth.     Review of patient's allergies indicates:  No Known Allergies    Objective:     Right Lower Extremity  NVI  WWP foot  Comp soft  Cap refill < 2 sec  Calf NT, soft  (-) Melinda sign  SAURABH  ROM : Patient is able to easily exhibit full flexion and extension on passive range of motion.   Wiggles toes  DF/PF intact  Sensation intact  Inc C/D/I  No SOI    Imaging:    No imaging obtained today    Assessment:       Encounter Diagnosis   Name Primary?    Status post total right knee replacement using cement Yes          Plan:       Simi was seen today for post-op evaluation.    Diagnoses and all orders for this visit:    Status post total right knee replacement using cement        Simi Constantino is an established pt here for  postop follow-up after right total knee replacement by Dr. Sullivan.  The patient will continue the current medication regimen and treatment plan.  Patient should notify the office of any signs or symptoms of infection including fevers, erythema, purulent drainage, increasing pain.  Patient will continue with DVT prophylaxis until at least 6 weeks postop.  Patient will continue outpatient physical therapy.  Will follow-up as scheduled. Patient verbalized understanding of all instructions and agreed with the above plan.    No follow-ups on file.    The patient understands, chooses and consents to this plan and accepts all   the risks which include but are not limited to the risks mentioned above.     Disclaimer: This note was prepared using a voice recognition system and is likely to have sound alike errors within the text.            [1]   Social History  Socioeconomic History    Marital status:    Tobacco Use    Smoking status: Never    Smokeless tobacco: Never   Substance and Sexual Activity    Alcohol use: Yes     Alcohol/week: 1.0 standard drink of alcohol     Types: 1 Glasses of wine per week

## 2025-05-12 ENCOUNTER — PATIENT MESSAGE (OUTPATIENT)
Dept: PODIATRY | Facility: CLINIC | Age: 60
End: 2025-05-12
Payer: COMMERCIAL

## 2025-05-12 ENCOUNTER — HOSPITAL ENCOUNTER (OUTPATIENT)
Dept: RADIOLOGY | Facility: HOSPITAL | Age: 60
Discharge: HOME | End: 2025-05-12
Attending: ORTHOPAEDIC SURGERY
Payer: COMMERCIAL

## 2025-05-12 ENCOUNTER — OFFICE VISIT (OUTPATIENT)
Dept: ORTHOPEDICS | Facility: CLINIC | Age: 60
End: 2025-05-12
Payer: COMMERCIAL

## 2025-05-12 VITALS — WEIGHT: 216 LBS | BODY MASS INDEX: 38.27 KG/M2 | HEIGHT: 63 IN

## 2025-05-12 DIAGNOSIS — B35.1 ONYCHOMYCOSIS DUE TO DERMATOPHYTE: Primary | ICD-10-CM

## 2025-05-12 DIAGNOSIS — M17.11 ARTHRITIS OF KNEE, RIGHT: ICD-10-CM

## 2025-05-12 DIAGNOSIS — Z96.651 STATUS POST TOTAL RIGHT KNEE REPLACEMENT USING CEMENT: Primary | ICD-10-CM

## 2025-05-12 PROCEDURE — 99024 POSTOP FOLLOW-UP VISIT: CPT | Mod: S$GLB,,, | Performed by: ORTHOPAEDIC SURGERY

## 2025-05-12 PROCEDURE — 1160F RVW MEDS BY RX/DR IN RCRD: CPT | Mod: CPTII,S$GLB,, | Performed by: ORTHOPAEDIC SURGERY

## 2025-05-12 PROCEDURE — 73564 X-RAY EXAM KNEE 4 OR MORE: CPT | Mod: 26,RT,, | Performed by: RADIOLOGY

## 2025-05-12 PROCEDURE — 99999 PR PBB SHADOW E&M-EST. PATIENT-LVL III: CPT | Mod: PBBFAC,,, | Performed by: ORTHOPAEDIC SURGERY

## 2025-05-12 PROCEDURE — 73562 X-RAY EXAM OF KNEE 3: CPT | Mod: 26,59,LT, | Performed by: RADIOLOGY

## 2025-05-12 PROCEDURE — 1159F MED LIST DOCD IN RCRD: CPT | Mod: CPTII,S$GLB,, | Performed by: ORTHOPAEDIC SURGERY

## 2025-05-12 PROCEDURE — 73564 X-RAY EXAM KNEE 4 OR MORE: CPT | Mod: TC,RT

## 2025-05-12 NOTE — PROGRESS NOTES
Subjective:     Patient ID: Simi Constantino is a 59 y.o. female.    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  She used to fall a lot down stairs growing up.  At 1 point Dr[facility]Jared Raya at [facility]  injected her knee with good relief.  Now she is having something moving and catching inside her knee with some swelling.  Any time she tries to bend it she feels something moves in then it will go and get going.  She does go to 5 gym and does quite a bit of quad strengthening and hamstring strengthening exercises and she described which she does.  Now she is working for the [employer] as a nurse.  She took up to 1600 mg of ibuprofen a day to help ease it up and now she is down to 600 mg. She feels some tightness in the knee.  No history of numbness or tingling or back pain or history of gout    No fever no chills no shortness of breath or difficulty with chewing or swallowing loss of bowel bladder control blurry vision double vision or loss of sense smell or taste    03/10/2022  Right knee arthritis.  Last visit we injected her with Depo-Medrol with great relief.  She takes ibuprofen 600 sometimes 800 mg once a day.  Occasional Tylenol.  She still working.  Her pain around 3/10.  With activities it gets really bad.  She is maintaining quite active life and she does exercise.  No fever no chills no shortness of breath or difficulty with chewing or swallowing loss of bowel bladder control blurry vision double vision loss sense smell or taste.  The injection seems to have helped quite a bit that she is not feeling catching locking and we did not proceed with the MRI        12/05/2022   Right knee arthritis.  She stated the Depo-Medrol last time given hurt a lot that day but the next day the pain completely subsided.  She only takes ibuprofen 800 once a day once it wears off.  This  time only wore off like a month ago.  We did discuss taking Tylenol instead of ibuprofen to decrease the risk of gastrointestinal issues as well as kidney issues.  Her pain is 5/10 when she over does it.  She still working at the state.    No fever no chills no shortness of breath or difficulty with chewing swallowing loss of bowel bladder control.  There is no feeling of any catching locking at this time    03/16/2023  Right knee arthritis.  The injections seems to work.  We obtained new x-ray today to compared previously from 2021.  Looking at her records she probably received an injection of steroid twice a year.  She does take occasional ibuprofen but she is worried about developing problems from it.  If she takes maybe once a week and ibuprofen 600.  She is traveling sometime in October and I would like her to come in like 3 4 weeks prior to see if she would like steroid injection.  She is not at a point that she knees viscosupplementation.  She keeps active as a nurse.  Today her pain is 0-2/10 however was much higher a week ago it eased up a little bit.  No fever no chills no shortness of breath or difficulty with chewing swallowing loss of bowel bladder control blurry vision double vision loss sense smell or taste        07/06/2023   Right knee arthritis/medial meniscus tear.  Last visit on 03/16/2023 she received Depo-Medrol injection.  She occasionally takes ibuprofen 800 over-the-counter and or Tylenol.  She does not take it constantly.  The other day she stepped in a hole in the yd and started with pain.  She works all day long in the [employer] in the evening she gets pain.  We discussed meloxicam as alternative to ibuprofen and that she can not take it with the ibuprofen and she wants to try it.  She is planning on taking a trip to [place] and will require a lot of walking to words the later part of October and she wants to come in just in case she needs an injection prior to the trip.  Occasionally the knee  becomes swollen and tight and I did tell her that is the nature of the arthritis it comes and goes and sometimes might take a day or 2 before the swelling goes away.  Does days she needs to take the anti-inflammatory and/or Tylenol.  These stays are infrequent.  Pain today 0/10 doing really well    06/27/2024   Right knee arthritis/you meniscus tear.  Depo-Medrol 03/16/2023 seems to help.  She wants another injection.  She is taking ibuprofen on as needed basis and sometimes Tylenol.  She did go on her trip and enjoy did.  However recently she lost her mother who was another patient of mine.  She is still working and she is still ambulating without any assistive devices.  We did warn her about taking too much anti-inflammatories.    No fever no chills no shortness of breath or difficulty with chewing swallowing   In the sitting position her pain 0/10 when she is becomes more active it goes up to 3/10  X-ray obtained today    10/28/2024   Right knee arthritis with varus deformity went over the x-rays from last visit with her.  Showed her that she has bone-on-bone right now in the right knee.  The left knee she has started with osteophyte underneath the patella very mild narrowing medially.  Depo-Medrol given 06/27/2024 seems to only helped for around 3 weeks.  She has been suffering after that.  The ibuprofen gave him severe bruising then she was switch to meloxicam and seems not helping.  Her knee is catching locking popping swelling up.  It is so painful at this time.  She has feels that the knee is weak.  She is using Voltaren gel topical.  No fever no chills no shortness of breath or difficulty with chewing swallowing loss of sense smell or taste    12/16/2024   Right knee severe arthritis and varus deformity.  She stated that physical therapy at [facility] helped quite a bit making her stronger.  She is able to do leg lifts in the exercises much better than before.  She said however she is having quite severe of  pain and catching on the inside of the knee and grinding underneath the kneecap.  Standing for any period more than 30 minutes seems to be bothering her quite a bit any twisting motion with severe catching.  Pain 5/10.  Had tried right knee Depo-Medrol 06/27/2024 and right knee Kenalog 10/28/2024 which helped significantly for 10 days.  Physical therapy helps but it makes her hurt later on but she felt that it did help with the strength in the walking.  She is not limping as much as used to be  She came to terms that she had tried numerous NSAIDs in the past and will not like proceed with total knee replacement in February 20, 2025.  We went over total knee replacement in details went over the x-rays.  Went over the pros and cons of surgery and the instructions.  Now she works as a  on a computer sitting down.  I did tell her most people would be out of work for 3 months but if she is doing well and you wants to return within 4 weeks I see no objections     She is not allergic to any medications at this time.  She can wear fake jewelry without problems    05/12/2025   Right TKA 02/12/2025   Pain is 0/10 patient extremely happy with the results.  She got rid of all her medications she is not taking anything.  Wondering if she can kneel on it I did tell if it does not bother her she can but not for long period of time.  She goes to [employer] and she would like to kneel every once in awhile.  She can do anything she needs at this time I wants to do.    No fever no chills no shortness breath no calf pain occasional numbness on the outside of the knee which I did tell her it is the infrapatellar branch of the saphenous nerve and most total knees have numbness on the outside it only bothers her during shaving it feels different however I did tell her it will become less of a problem as time goes by  Past Medical History:   Diagnosis Date    Vitamin D deficiency      Past Surgical History:   Procedure Laterality  Date    COLONOSCOPY      x3    TONSILLECTOMY  1970    TOTAL KNEE ARTHROPLASTY Right 2/12/2025    Procedure: ARTHROPLASTY, KNEE, TOTAL;  Surgeon: Arden Sullivan MD;  Location: [facility];  Service: Orthopedics;  Laterality: Right;    TOTAL VAGINAL HYSTERECTOMY  2016     Family History   Problem Relation Name Age of Onset    Cancer Mother          Liver cancer    Hyperlipidemia Mother      Cancer Father          prostate cancer    Charcot-Catarina-Tooth disease Father      No Known Problems Sister      No Known Problems Brother      No Known Problems Maternal Grandmother      No Known Problems Maternal Grandfather      No Known Problems Paternal Grandmother      No Known Problems Paternal Grandfather       Social History     Socioeconomic History    Marital status:    Tobacco Use    Smoking status: Never    Smokeless tobacco: Never   Substance and Sexual Activity    Alcohol use: Yes     Alcohol/week: 1.0 standard drink of alcohol     Types: 1 Glasses of wine per week     Medication List with Changes/Refills   Current Medications    ACETAMINOPHEN (TYLENOL) 650 MG TBSR    Take 650 mg by mouth every 8 (eight) hours.    MUPIROCIN (BACTROBAN) 2 % OINTMENT    Apply topically 2 (two) times daily.    VITAMIN D (VITAMIN D3) 1000 UNITS TAB    Take 185 mg by mouth.   Discontinued Medications    ASPIRIN 81 MG CHEW    Take 81 mg by mouth once daily.    DICLOFENAC (VOLTAREN) 75 MG EC TABLET    Take 1 tablet (75 mg total) by mouth 2 (two) times daily as needed (Pain). Take with food    GABAPENTIN (NEURONTIN) 300 MG CAPSULE    Take 1 capsule (300 mg total) by mouth every evening.    ONDANSETRON (ZOFRAN) 4 MG TABLET    Take 1 tablet (4 mg total) by mouth every 6 (six) hours as needed for Nausea.    OXYCODONE-ACETAMINOPHEN (PERCOCET)  MG PER TABLET    Take 1 tablet by mouth every 6 (six) hours as needed for Pain.     Review of patient's allergies indicates:  No Known Allergies  Review of Systems   Constitutional: Negative  for decreased appetite.   HENT:  Negative for tinnitus.    Eyes:  Negative for double vision.   Cardiovascular:  Negative for chest pain.   Respiratory:  Negative for wheezing.    Hematologic/Lymphatic: Negative for bleeding problem.   Skin:  Negative for dry skin.   Musculoskeletal:  Positive for joint pain, joint swelling and stiffness. Negative for arthritis, back pain, gout and neck pain.   Gastrointestinal:  Negative for abdominal pain.   Genitourinary:  Negative for bladder incontinence.   Neurological:  Negative for numbness, paresthesias and sensory change.   Psychiatric/Behavioral:  Negative for altered mental status.        Objective:   Body mass index is 38.26 kg/m².  There were no vitals filed for this visit.       General    Constitutional: She is oriented to person, place, and time. She appears well-developed.   HENT:   Head: Atraumatic.   Eyes: EOM are normal.   Cardiovascular:  Normal rate.            Pulmonary/Chest: Effort normal.   Neurological: She is alert and oriented to person, place, and time.   Psychiatric: Judgment normal.           Ambulating with very slight limp.    Pelvis is level  Bilateral hips full motion no pain in the groin  Hip flexors, abductors, adductors, quads, hamstrings, ankle extensors and flexors are 5/5  Left knee with full motion no pain.  Collaterals and cruciates are stable.  Mild crepitus anteriorly, no warmness to touch no swelling.  No defect in the patella or quadriceps tendon  Right knee preop mild swelling.  Medial joint pain to range of motion and to palpation.  Positive Meghann sign medially.  There is some crepitus to compression on the patella.  There is a plica  you can feel on the lateral femoral condyle.  Collaterals and cruciates are stable.  Range of motion 0-120 degrees.  7° of varus deformity.  No defect in the patella or quadriceps tendon    Right knee postop TKA surgical incision healed well.  Range of motion 0-130 degrees.  No defect in the patella  or quadriceps tendon.  Slight decrease in sensation over the lateral aspect of the knee joint consistent with infrapatellar branch of the saphenous nerve  Calves are soft nontender  Ankle motion is intact able to move the ankle up and down on the right  Negative straight leg raising    Relevant imaging results reviewed and interpreted by me, discussed with the patient and / or family today   X-ray 05/12/2025 right TKA Depuy you attune knee posterior stabilize with stemmed tibial component in excellent alignment.  Patella midline.  No fracture seen    X-ray 06/27/2024 bilateral knees with the right knee  complete loss of joint space which has progressed since last year.  Sclerosis of the bone small cystic changes and varus deformity.  The left knee very mild degenerative changes.  No evidence of fracture  X-ray 03/16/2023 bilateral knees with the right knee still moderate medial joint narrowing similar to x-ray from 11/11/2021.  There is small marginal osteophytic changes.  The left knee seems to be very mild if any medial joint narrowing.  No osteophytes seen no fractures.  Quality of the bone is excellent  X-ray 11/11/2021 bilateral knees with the right knee moderate medial joint loss with marginal osteophytes most pronounced medially in and slightly anteriorly.  There is no evidence of fracture with mild effusion.  Left knee with very mild if any medial joint narrowing.  No fracture no osteophytes seen      Assessment:     Encounter Diagnosis   Name Primary?    Status post total right knee replacement using cement Yes          Plan:   Status post total right knee replacement using cement           Patient Instructions   X-ray show excellent alignment of her right total knee replacement   You have full motion 0-130 degrees   You not having any pain continue independent exercise   If you wish to kneel occasionally it is not a bad issue however if it hurts you get out of   I will see you back in 1  year      Disclaimer: This note was prepared using a voice recognition system and is likely to have sound alike errors within the text.

## 2025-05-12 NOTE — PATIENT INSTRUCTIONS
X-ray show excellent alignment of her right total knee replacement   You have full motion 0-130 degrees   You not having any pain continue independent exercise   If you wish to kneel occasionally it is not a bad issue however if it hurts you get out of   I will see you back in 1 year

## 2025-06-19 ENCOUNTER — PATIENT MESSAGE (OUTPATIENT)
Dept: ORTHOPEDICS | Facility: CLINIC | Age: 60
End: 2025-06-19
Payer: COMMERCIAL

## 2025-06-19 ENCOUNTER — RESULTS FOLLOW-UP (OUTPATIENT)
Dept: PODIATRY | Facility: CLINIC | Age: 60
End: 2025-06-19

## 2025-06-19 ENCOUNTER — LAB VISIT (OUTPATIENT)
Dept: LAB | Facility: HOSPITAL | Age: 60
End: 2025-06-19
Attending: PODIATRIST
Payer: COMMERCIAL

## 2025-06-19 DIAGNOSIS — B35.1 ONYCHOMYCOSIS DUE TO DERMATOPHYTE: ICD-10-CM

## 2025-06-19 LAB
ALBUMIN SERPL BCP-MCNC: 4.3 G/DL (ref 3.5–5.2)
ALP SERPL-CCNC: 55 UNIT/L (ref 40–150)
ALT SERPL W/O P-5'-P-CCNC: 12 UNIT/L (ref 10–44)
ANION GAP (OHS): 9 MMOL/L (ref 8–16)
AST SERPL-CCNC: 18 UNIT/L (ref 11–45)
BILIRUB SERPL-MCNC: 0.9 MG/DL (ref 0.1–1)
BUN SERPL-MCNC: 17 MG/DL (ref 6–20)
CALCIUM SERPL-MCNC: 9.2 MG/DL (ref 8.7–10.5)
CHLORIDE SERPL-SCNC: 103 MMOL/L (ref 95–110)
CO2 SERPL-SCNC: 27 MMOL/L (ref 23–29)
CREAT SERPL-MCNC: 0.8 MG/DL (ref 0.5–1.4)
GFR SERPLBLD CREATININE-BSD FMLA CKD-EPI: >60 ML/MIN/1.73/M2
GLUCOSE SERPL-MCNC: 65 MG/DL (ref 70–110)
POTASSIUM SERPL-SCNC: 4.3 MMOL/L (ref 3.5–5.1)
PROT SERPL-MCNC: 6.6 GM/DL (ref 6–8.4)
SODIUM SERPL-SCNC: 139 MMOL/L (ref 136–145)

## 2025-06-19 PROCEDURE — 36415 COLL VENOUS BLD VENIPUNCTURE: CPT

## 2025-06-19 PROCEDURE — 80053 COMPREHEN METABOLIC PANEL: CPT

## (undated) DEVICE — SYR 30CC LUER LOCK

## (undated) DEVICE — DRAPE STERI U-SHAPED 47X51IN

## (undated) DEVICE — DRAPE FULL SHEET 70X100IN

## (undated) DEVICE — BLADE EZ CLEAN 2 1/2

## (undated) DEVICE — BLADE RECIP DOUBLE SIDED

## (undated) DEVICE — YANKAUER FLEX NO VENT REG CAP

## (undated) DEVICE — SOCKINETTE IMPERVIOUS 12X48IN

## (undated) DEVICE — SYS SURGIPHOR STRL IRRG

## (undated) DEVICE — DRAPE THREE-QTR REINF 53X77IN

## (undated) DEVICE — TOWEL OR DISP STRL BLUE 4/PK

## (undated) DEVICE — STAPLER SKIN PROXIMATE WIDE

## (undated) DEVICE — COVER TABLE HVY DTY 60X90IN

## (undated) DEVICE — DRAPE INCISE IOBAN 2 23X33IN

## (undated) DEVICE — GOWN NONREINF SET-IN SLV 2XL

## (undated) DEVICE — GLOVE SURG PLYSPHRN ORTH SZ7.5

## (undated) DEVICE — CONTAINER SPECIMEN OR STER 4OZ

## (undated) DEVICE — DRAPE ORTH SPLIT 77X108IN

## (undated) DEVICE — WRAP PROTECTIVE LEG POS STRL

## (undated) DEVICE — KIT IRR SUCTION HND PIECE

## (undated) DEVICE — PAD ABDOMINAL STERILE 8X10IN

## (undated) DEVICE — SUT 0 27IN COATED VICRYL CP

## (undated) DEVICE — DRESSING PICO 7 TWO 10X30CM

## (undated) DEVICE — APPLICATOR CHLORAPREP ORN 26ML

## (undated) DEVICE — NDL SPINAL 18GX3.5 SPINOCAN

## (undated) DEVICE — MANIFOLD 4 PORT

## (undated) DEVICE — MIXER BONE CEMENT

## (undated) DEVICE — SUT VICRYL 1 OB 36 CTX

## (undated) DEVICE — DRAPE T EXTRM SURG 121X128X90

## (undated) DEVICE — BLADE SAG 18.0X1.27X100

## (undated) DEVICE — COVER LIGHT HANDLE 80/CA

## (undated) DEVICE — SPONGE LAP 18X18 PREWASHED

## (undated) DEVICE — ALCOHOL 70% ANTISEPTIC ISO 4OZ

## (undated) DEVICE — SUT STRATAFIX PGAPCL 3 FS-1

## (undated) DEVICE — BNDG COFLEX FOAM LF2 ST 4X5YD

## (undated) DEVICE — SPONGE COTTON TRAY 4X4IN

## (undated) DEVICE — DRAPE U SPLIT SHEET 54X76IN

## (undated) DEVICE — TUBING MEDI-VAC 20FT .25IN

## (undated) DEVICE — HEADREST ROUND DISP FOAM 9IN

## (undated) DEVICE — SUT VICRYL 2-0 27 CT-1

## (undated) DEVICE — SOL NACL IRR 3000ML

## (undated) DEVICE — GOWN POLY REINF X-LONG XL

## (undated) DEVICE — EVACUATOR PENCIL SMOKE NEPTUNE

## (undated) DEVICE — HOOD FLYTE PEELWY STERISHIELD

## (undated) DEVICE — BANDAGE ACE DOUBLE STER 6IN

## (undated) DEVICE — COVER PROXIMA MAYO STAND

## (undated) DEVICE — ELECTRODE REM PLYHSV RETURN 9

## (undated) DEVICE — TAPE SILK 3IN

## (undated) DEVICE — POUCH INSTRUMENT 2 POCKET

## (undated) DEVICE — PACK BASIC SETUP SC BR